# Patient Record
Sex: MALE | Race: WHITE | URBAN - METROPOLITAN AREA
[De-identification: names, ages, dates, MRNs, and addresses within clinical notes are randomized per-mention and may not be internally consistent; named-entity substitution may affect disease eponyms.]

---

## 2017-01-05 ENCOUNTER — TELEPHONE (OUTPATIENT)
Dept: FAMILY MEDICINE | Facility: CLINIC | Age: 37
End: 2017-01-05

## 2017-01-05 NOTE — TELEPHONE ENCOUNTER
Panel Management Review      Patient has the following on his problem list:     Hypertension   Last three blood pressure readings:  BP Readings from Last 3 Encounters:   06/10/16 132/92   05/13/16 160/100   03/16/16 134/92     Blood pressure: Failed    HTN Guidelines:  Age 18-59 BP range:  Less than 140/90  Age 60-85 with Diabetes:  Less than 140/90  Age 60-85 without Diabetes:  less than 150/90      Composite cancer screening  Chart review shows that this patient is due/due soon for the following None  Summary:    Patient is due/failing the following:   BP CHECK and LDL    Action needed:   Patient needs nurse only appointment.    Type of outreach:    Phone, spoke to patient.  patient states that he has moved and will be establishing care with a new clinic.      Questions for provider review:    None                                                                                                                                    Michi Palm CMA       Chart routed to n/a .

## 2017-10-06 ENCOUNTER — TRANSFERRED RECORDS (OUTPATIENT)
Dept: HEALTH INFORMATION MANAGEMENT | Facility: CLINIC | Age: 37
End: 2017-10-06

## 2017-10-19 ENCOUNTER — TELEPHONE (OUTPATIENT)
Dept: SURGERY | Facility: CLINIC | Age: 37
End: 2017-10-19

## 2017-10-19 ENCOUNTER — OFFICE VISIT (OUTPATIENT)
Dept: NEUROSURGERY | Facility: CLINIC | Age: 37
End: 2017-10-19
Payer: COMMERCIAL

## 2017-10-19 VITALS — BODY MASS INDEX: 29.1 KG/M2 | TEMPERATURE: 98 F | HEIGHT: 68 IN | WEIGHT: 192 LBS

## 2017-10-19 DIAGNOSIS — M51.9 DISC DISORDER OF LUMBAR REGION: Primary | ICD-10-CM

## 2017-10-19 DIAGNOSIS — M51.26 LUMBAR DISC HERNIATION: ICD-10-CM

## 2017-10-19 PROCEDURE — 99204 OFFICE O/P NEW MOD 45 MIN: CPT | Performed by: NEUROLOGICAL SURGERY

## 2017-10-19 RX ORDER — HYDROCODONE BITARTRATE AND ACETAMINOPHEN 10; 325 MG/1; MG/1
1 TABLET ORAL EVERY 4 HOURS PRN
Qty: 60 TABLET | Refills: 0 | Status: SHIPPED | OUTPATIENT
Start: 2017-10-19 | End: 2017-11-13 | Stop reason: ALTCHOICE

## 2017-10-19 ASSESSMENT — PAIN SCALES - GENERAL: PAINLEVEL: MODERATE PAIN (5)

## 2017-10-19 NOTE — PROGRESS NOTES
"37-year-old male with L4 5 disc herniation and extruded fragment, severe left leg pain.  Initially had a back injury seven years ago.  Now, over the last four weeks, having eight out of 10, sharp pain from the left hip, the posterior thigh and calf.  No overt weakness.  Worse when he walks.  When he had his past injury, he had successful management with physical therapy and an epidural steroid injection.  Had MRI of the lumbar spine which demonstrated L4 5 disc herniation.         Past Medical History:   Diagnosis Date     Anxiety      Anxiety      Hypertension      Past Surgical History:   Procedure Laterality Date     HC REPAIR EXTEN TENDON FINGER W/O GRAFT, EACH  5/2/2006    Repair of extensor tendon, left hand, long finger MP joint.     Social History     Social History     Marital status: Single     Spouse name: N/A     Number of children: N/A     Years of education: N/A     Occupational History     Not on file.     Social History Main Topics     Smoking status: Former Smoker     Packs/day: 1.00     Types: Cigarettes     Quit date: 6/2/2016     Smokeless tobacco: Current User     Types: Chew     Alcohol use 0.0 oz/week     0 Standard drinks or equivalent per week      Comment: rare     Drug use: Yes      Comment: occasionally marijuana     Sexual activity: Not Currently     Other Topics Concern     Not on file     Social History Narrative     Family History   Problem Relation Age of Onset     Other Cancer Mother      skin     Other Cancer Maternal Grandfather      pancreatic     Anxiety Disorder Mother         ROS: 10 point ROS neg other than the symptoms noted above in the HPI.    Physical Exam  Temp 98  F (36.7  C) (Temporal)  Ht 1.727 m (5' 8\")  Wt 87.1 kg (192 lb)  BMI 29.19 kg/m2  HEENT:  Normocephalic, atraumatic.  PERRLA.  EOM s intact.  Visual fields full to gross exam  Neck:  Supple, non-tender, without lymphadenopathy.  Heart:  No peripheral edema  Lungs:  No SOB  Abdomen:  Non-distended.   Skin:  " Warm and dry.  Extremities:  No edema, cyanosis or clubbing.    NEUROLOGICAL EXAMINATION:     Mental status:  Alert and Oriented x 3, speech is fluent.  Cranial nerves:  II-XII intact.   Motor:    Shoulder Abduction:  Right:  5/5   Left:  5/5  Biceps:                      Right:  5/5   Left:  5/5  Triceps:                     Right:  5/5   Left:  5/5  Wrist Extensors:       Right:  5/5   Left:  5/5  Wrist Flexors:           Right:  5/5   Left:  5/5  interosseus :            Right:  5/5   Left:  5/5   Hip Flexor:                Right: 5/5  Left:  5/5  Hip Adductor:             Right:  5/5  Left:  5/5  Hip Abductor:             Right:  5/5  Left:  5/5  Gastroc Soleus:        Right:  5/5  Left:  5/5  Tib/Ant:                      Right:  5/5  Left:  5/5  EHL:                     Right:  5/5  Left:  5/5  Sensation:  Intact  Reflexes:  Negative Babinski.  Negative Clonus.  Negative Villanueva's.  Coordination:  Smooth finger to nose testing.   Negative pronator drift.  Smooth tandem walking.    A/P:  37-year-old male with L4 5 disc herniation and extruded fragment, severe left leg pain    I had a discussion with the patient, reviewing the history, symptoms, and imaging  We will start with a course of conservative management  Will refer for physical therapy and left L4 5 epidural steroid injection  We did give him a prescription for Norco  We discussed that if his pain persists, we couldn't the future consider minimally invasive microdiscectomy

## 2017-10-19 NOTE — PATIENT INSTRUCTIONS
1. Referral for injection with Dr. Sloan. They will call you to schedule.  2. Referral for physical therapy. They will call you to schedule.   3. Call clinic if symptoms persist.      Please call our clinic with any questions or concerns: [phone]

## 2017-10-19 NOTE — PROGRESS NOTES
"Jared J Behm is a 37 year old male who presents for:  Chief Complaint   Patient presents with     Neurologic Problem     low back issues w/ left leg pain        Initial Vitals:  Temp 98  F (36.7  C) (Temporal)  Ht 5' 8\" (1.727 m)  Wt 192 lb (87.1 kg)  BMI 29.19 kg/m2 Estimated body mass index is 29.19 kg/(m^2) as calculated from the following:    Height as of this encounter: 5' 8\" (1.727 m).    Weight as of this encounter: 192 lb (87.1 kg).. Body surface area is 2.04 meters squared. BP completed using cuff size: NA (Not Taken)  Moderate Pain (5)    Do you feel safe in your environment?  Yes  Do you need any refills today? No    Nursing Comments:         Lurdes Dockery CMA    "

## 2017-10-19 NOTE — MR AVS SNAPSHOT
"              After Visit Summary   10/19/2017    Jared J Behm    MRN: [id number]           Patient Information     Date Of Birth          1980        Visit Information        Provider Department      10/19/2017 2:20 PM Sam Briscoe MD [facility]        Today's Diagnoses     Disc disorder of lumbar region    -  1    Lumbar disc herniation          Care Instructions    1. Referral for injection with Dr. Sloan. They will call you to schedule.  2. Referral for physical therapy. They will call you to schedule.   3. Call clinic if symptoms persist.      Please call our clinic with any questions or concerns: [phone]            Follow-ups after your visit        Additional Services     PHYSICAL THERAPY REFERRAL       *This therapy referral will be filtered to a centralized scheduling office at [facility] and the patient will receive a call to schedule an appointment at a [place] location most convenient for them. *     [facility] provides Physical Therapy evaluation and treatment and many specialty services across the [place] system.  If requesting a specialty program, please choose from the list below.    If you have not heard from the scheduling office within 2 business days, please call [phone] for all locations, with the exception of [place], please call [phone].  Treatment: Evaluation & Treatment  Special Instructions/Modalities:   Special Programs: None    Please be aware that coverage of these services is subject to the terms and limitations of your health insurance plan.  Call member services at your health plan with any benefit or coverage questions.      **Note to Provider:  If you are referring outside of [facility] for the therapy appointment, please list the name of the location in the \"special instructions\" above, print the referral and give to the patient to schedule the appointment.                  Future tests that were " "ordered for you today     Open Future Orders        Priority Expected Expires Ordered    XR Lumbar Epidural Injection Incl Imaging Routine 10/19/2017 10/19/2018 10/19/2017            Who to contact     If you have questions or need follow up information about today's clinic visit or your schedule please contact [facility] directly at [phone].  Normal or non-critical lab and imaging results will be communicated to you by MyChart, letter or phone within 4 business days after the clinic has received the results. If you do not hear from us within 7 days, please contact the clinic through [facility] or phone. If you have a critical or abnormal lab result, we will notify you by phone as soon as possible.  Submit refill requests through [facility] or call your pharmacy and they will forward the refill request to us. Please allow 3 business days for your refill to be completed.          Additional Information About Your Visit        [facility] Information     [facility] lets you send messages to your doctor, view your test results, renew your prescriptions, schedule appointments and more. To sign up, go to [email or web address] . Click on \"Log in\" on the left side of the screen, which will take you to the Welcome page. Then click on \"Sign up Now\" on the right side of the page.     You will be asked to enter the access code listed below, as well as some personal information. Please follow the directions to create your username and password.     Your access code is: [email or web address]  Expires: 2018  2:23 PM     Your access code will  in 90 days. If you need help or a new code, please call your [facility] clinic or [phone].        Care EveryWhere ID     This is your Care EveryWhere ID. This could be used by other organizations to access your [facility] medical records  [id number]        Your Vitals Were     Temperature Height BMI (Body Mass Index)             98  F (36.7  C) (Temporal) 5' 8\" (1.727 m) 29.19 " kg/m2          Blood Pressure from Last 3 Encounters:   06/10/16 (!) 132/92   05/13/16 (!) 160/100   03/16/16 (!) 134/92    Weight from Last 3 Encounters:   10/19/17 192 lb (87.1 kg)   11/30/16 193 lb (87.5 kg)   06/10/16 173 lb (78.5 kg)              We Performed the Following     PHYSICAL THERAPY REFERRAL          Today's Medication Changes          These changes are accurate as of: 10/19/17  2:23 PM.  If you have any questions, ask your nurse or doctor.               Start taking these medicines.        Dose/Directions    HYDROcodone-acetaminophen  MG per tablet   Commonly known as:  NORCO   Used for:  Disc disorder of lumbar region   Started by:  Sam Briscoe MD        Dose:  1 tablet   Take 1 tablet by mouth every 4 hours as needed for moderate to severe pain maximum 4 tablet(s) per day   Quantity:  60 tablet   Refills:  0            Where to get your medicines      Some of these will need a paper prescription and others can be bought over the counter.  Ask your nurse if you have questions.     Bring a paper prescription for each of these medications     HYDROcodone-acetaminophen  MG per tablet                Primary Care Provider    None Specified       No primary provider on file.        Equal Access to Services     JENNIFER IRELAND : Edda Tinsley, saba acosta, qanorbert kaalkath jordan, shabbir eduardo. So [facility] [phone].    ATENCIÓN: Si habla español, tiene a torres disposición servicios gratuitos de asistencia lingüística. Llame al [phone].    We comply with applicable federal civil rights laws and [place] laws. We do not discriminate on the basis of race, color, national origin, age, disability, sex, sexual orientation, or gender identity.            Thank you!     Thank you for choosing [facility]  for your care. Our goal is always to provide you with excellent care. Hearing back from our patients is one way we can  continue to improve our services. Please take a few minutes to complete the written survey that you may receive in the mail after your visit with us. Thank you!             Your Updated Medication List - Protect others around you: Learn how to safely use, store and throw away your medicines at www.disposemymeds.org.          This list is accurate as of: 10/19/17  2:23 PM.  Always use your most recent med list.                   Brand Name Dispense Instructions for use Diagnosis    ALPRAZolam 0.25 MG tablet    XANAX    30 tablet    Take 1 tablet (0.25 mg) by mouth 2 times daily as needed for anxiety (anxiety attacks)    Social anxiety disorder       atenolol-chlorthalidone 50-25 MG per tablet    TENORETIC    30 tablet    TAKE ONE TABLET BY MOUTH ONCE DAILY    Essential hypertension, benign       clotrimazole 1 % cream    LOTRIMIN    15 g    Apply topically 2 times daily    Tinea cruris       divalproex 250 MG 24 hr tablet    DEPAKOTE ER    30 tablet    Take 1 tablet (250 mg) by mouth daily    Depression with anxiety       HYDROcodone-acetaminophen  MG per tablet    NORCO    60 tablet    Take 1 tablet by mouth every 4 hours as needed for moderate to severe pain maximum 4 tablet(s) per day    Disc disorder of lumbar region

## 2017-10-23 ENCOUNTER — TELEPHONE (OUTPATIENT)
Dept: SURGERY | Facility: CLINIC | Age: 37
End: 2017-10-23

## 2017-10-24 ENCOUNTER — TRANSFERRED RECORDS (OUTPATIENT)
Dept: HEALTH INFORMATION MANAGEMENT | Facility: CLINIC | Age: 37
End: 2017-10-24

## 2017-10-26 NOTE — TELEPHONE ENCOUNTER
Patient called and scheduled LESI with Dr. Sloan 12/14/17 at 0800.  Instructed to have a  and to make an appt for a preop physical.

## 2017-11-13 ENCOUNTER — TELEPHONE (OUTPATIENT)
Dept: NEUROSURGERY | Facility: CLINIC | Age: 37
End: 2017-11-13

## 2017-11-13 DIAGNOSIS — G89.29 CHRONIC BILATERAL LOW BACK PAIN, WITH SCIATICA PRESENCE UNSPECIFIED: Primary | ICD-10-CM

## 2017-11-13 DIAGNOSIS — M54.5 CHRONIC BILATERAL LOW BACK PAIN, WITH SCIATICA PRESENCE UNSPECIFIED: Primary | ICD-10-CM

## 2017-11-13 RX ORDER — OXYCODONE AND ACETAMINOPHEN 5; 325 MG/1; MG/1
1-2 TABLET ORAL EVERY 6 HOURS PRN
Qty: 60 TABLET | Refills: 0 | Status: ON HOLD | OUTPATIENT
Start: 2017-11-13 | End: 2018-08-09

## 2017-11-13 NOTE — TELEPHONE ENCOUNTER
Reason for Call:  Medication or medication refill:    Do you use a [facility]?  Name of the pharmacy and phone number for the current request:  [facility] - [place] - [phone]    Name of the medication requested: Patient was on Hydrocodone but this hasn't been working for him, wondering if he can get something different. Wasn't able to get injection until 12/14 looking to get something until then.     Other request:       Can we leave a detailed message on this number? YES    Phone number patient can be reached at: Home number on file [phone] (home)    Best Time: any    Call taken on 11/13/2017 at 7:58 AM by Aminata Pollack

## 2017-11-13 NOTE — TELEPHONE ENCOUNTER
Patient called back. He recently saw Dr. Briscoe and is schedule for injection on 12/14/17. Dr. Briscoe prescribed norco at the time, but patient is requesting something a little stronger to help manage pain until injection. Offered percocet; pt in agreement. Will send to Shaq Kan PA-C and have available at [facility] pharmacy for .

## 2017-11-24 ENCOUNTER — TRANSFERRED RECORDS (OUTPATIENT)
Dept: HEALTH INFORMATION MANAGEMENT | Facility: CLINIC | Age: 37
End: 2017-11-24

## 2017-11-29 ENCOUNTER — TRANSFERRED RECORDS (OUTPATIENT)
Dept: HEALTH INFORMATION MANAGEMENT | Facility: CLINIC | Age: 37
End: 2017-11-29

## 2017-12-13 ENCOUNTER — ANESTHESIA EVENT (OUTPATIENT)
Dept: SURGERY | Facility: CLINIC | Age: 37
End: 2017-12-13
Payer: COMMERCIAL

## 2017-12-13 ASSESSMENT — LIFESTYLE VARIABLES: TOBACCO_USE: 1

## 2017-12-14 ENCOUNTER — HOSPITAL ENCOUNTER (OUTPATIENT)
Facility: CLINIC | Age: 37
Discharge: HOME | End: 2017-12-14
Attending: ANESTHESIOLOGY | Admitting: ANESTHESIOLOGY
Payer: COMMERCIAL

## 2017-12-14 ENCOUNTER — ANESTHESIA (OUTPATIENT)
Dept: SURGERY | Facility: CLINIC | Age: 37
End: 2017-12-14
Payer: COMMERCIAL

## 2017-12-14 ENCOUNTER — HOSPITAL ENCOUNTER (OUTPATIENT)
Dept: GENERAL RADIOLOGY | Facility: CLINIC | Age: 37
End: 2017-12-14
Attending: ANESTHESIOLOGY | Admitting: ANESTHESIOLOGY
Payer: COMMERCIAL

## 2017-12-14 VITALS
SYSTOLIC BLOOD PRESSURE: 114 MMHG | RESPIRATION RATE: 16 BRPM | DIASTOLIC BLOOD PRESSURE: 81 MMHG | OXYGEN SATURATION: 96 %

## 2017-12-14 DIAGNOSIS — M51.26 LUMBAR HERNIATED DISC: ICD-10-CM

## 2017-12-14 PROCEDURE — 25000125 ZZHC RX 250: Performed by: NURSE ANESTHETIST, CERTIFIED REGISTERED

## 2017-12-14 PROCEDURE — 25000128 H RX IP 250 OP 636: Performed by: NURSE ANESTHETIST, CERTIFIED REGISTERED

## 2017-12-14 PROCEDURE — 62323 NJX INTERLAMINAR LMBR/SAC: CPT | Performed by: ANESTHESIOLOGY

## 2017-12-14 PROCEDURE — 25000128 H RX IP 250 OP 636: Performed by: ANESTHESIOLOGY

## 2017-12-14 PROCEDURE — 40000277 XR FLUORO NEEDLE PLACEMENT SPINE (WITH PROCEDURE)

## 2017-12-14 PROCEDURE — 37000008 ZZH ANESTHESIA TECHNICAL FEE, 1ST 30 MIN: Performed by: ANESTHESIOLOGY

## 2017-12-14 RX ORDER — LIDOCAINE HYDROCHLORIDE 20 MG/ML
INJECTION, SOLUTION INFILTRATION; PERINEURAL PRN
Status: DISCONTINUED | OUTPATIENT
Start: 2017-12-14 | End: 2017-12-14

## 2017-12-14 RX ORDER — IOPAMIDOL 612 MG/ML
INJECTION, SOLUTION INTRATHECAL PRN
Status: DISCONTINUED | OUTPATIENT
Start: 2017-12-14 | End: 2017-12-14 | Stop reason: HOSPADM

## 2017-12-14 RX ORDER — ONDANSETRON 2 MG/ML
4 INJECTION INTRAMUSCULAR; INTRAVENOUS EVERY 30 MIN PRN
Status: CANCELLED | OUTPATIENT
Start: 2017-12-14

## 2017-12-14 RX ORDER — FENTANYL CITRATE 50 UG/ML
25-50 INJECTION, SOLUTION INTRAMUSCULAR; INTRAVENOUS
Status: CANCELLED | OUTPATIENT
Start: 2017-12-14

## 2017-12-14 RX ORDER — SODIUM CHLORIDE, SODIUM LACTATE, POTASSIUM CHLORIDE, CALCIUM CHLORIDE 600; 310; 30; 20 MG/100ML; MG/100ML; MG/100ML; MG/100ML
INJECTION, SOLUTION INTRAVENOUS CONTINUOUS
Status: CANCELLED | OUTPATIENT
Start: 2017-12-14

## 2017-12-14 RX ORDER — PROPOFOL 10 MG/ML
INJECTION, EMULSION INTRAVENOUS PRN
Status: DISCONTINUED | OUTPATIENT
Start: 2017-12-14 | End: 2017-12-14

## 2017-12-14 RX ORDER — TRIAMCINOLONE ACETONIDE 40 MG/ML
INJECTION, SUSPENSION INTRA-ARTICULAR; INTRAMUSCULAR PRN
Status: DISCONTINUED | OUTPATIENT
Start: 2017-12-14 | End: 2017-12-14 | Stop reason: HOSPADM

## 2017-12-14 RX ORDER — ARIPIPRAZOLE ORAL 1 MG/ML
5 SOLUTION ORAL DAILY
COMMUNITY

## 2017-12-14 RX ORDER — LIDOCAINE 40 MG/G
CREAM TOPICAL
Status: DISCONTINUED | OUTPATIENT
Start: 2017-12-14 | End: 2017-12-14 | Stop reason: HOSPADM

## 2017-12-14 RX ORDER — MEPERIDINE HYDROCHLORIDE 50 MG/ML
12.5 INJECTION INTRAMUSCULAR; INTRAVENOUS; SUBCUTANEOUS
Status: CANCELLED | OUTPATIENT
Start: 2017-12-14

## 2017-12-14 RX ORDER — ONDANSETRON 4 MG/1
4 TABLET, ORALLY DISINTEGRATING ORAL EVERY 30 MIN PRN
Status: CANCELLED | OUTPATIENT
Start: 2017-12-14

## 2017-12-14 RX ORDER — NALOXONE HYDROCHLORIDE 0.4 MG/ML
.1-.4 INJECTION, SOLUTION INTRAMUSCULAR; INTRAVENOUS; SUBCUTANEOUS
Status: CANCELLED | OUTPATIENT
Start: 2017-12-14 | End: 2017-12-15

## 2017-12-14 RX ORDER — SODIUM CHLORIDE, SODIUM LACTATE, POTASSIUM CHLORIDE, CALCIUM CHLORIDE 600; 310; 30; 20 MG/100ML; MG/100ML; MG/100ML; MG/100ML
INJECTION, SOLUTION INTRAVENOUS CONTINUOUS
Status: DISCONTINUED | OUTPATIENT
Start: 2017-12-14 | End: 2017-12-14 | Stop reason: HOSPADM

## 2017-12-14 RX ADMIN — PROPOFOL 50 MG: 10 INJECTION, EMULSION INTRAVENOUS at 09:13

## 2017-12-14 RX ADMIN — LIDOCAINE HYDROCHLORIDE 1 ML: 10 INJECTION, SOLUTION EPIDURAL; INFILTRATION; INTRACAUDAL; PERINEURAL at 08:41

## 2017-12-14 RX ADMIN — PROPOFOL 50 MG: 10 INJECTION, EMULSION INTRAVENOUS at 09:21

## 2017-12-14 RX ADMIN — PROPOFOL 30 MG: 10 INJECTION, EMULSION INTRAVENOUS at 09:19

## 2017-12-14 RX ADMIN — LIDOCAINE HYDROCHLORIDE 40 MG: 20 INJECTION, SOLUTION INFILTRATION; PERINEURAL at 09:13

## 2017-12-14 RX ADMIN — PROPOFOL 30 MG: 10 INJECTION, EMULSION INTRAVENOUS at 09:18

## 2017-12-14 RX ADMIN — PROPOFOL 20 MG: 10 INJECTION, EMULSION INTRAVENOUS at 09:17

## 2017-12-14 RX ADMIN — PROPOFOL 50 MG: 10 INJECTION, EMULSION INTRAVENOUS at 09:15

## 2017-12-14 RX ADMIN — PROPOFOL 50 MG: 10 INJECTION, EMULSION INTRAVENOUS at 09:14

## 2017-12-14 RX ADMIN — SODIUM CHLORIDE, POTASSIUM CHLORIDE, SODIUM LACTATE AND CALCIUM CHLORIDE: 600; 310; 30; 20 INJECTION, SOLUTION INTRAVENOUS at 08:41

## 2017-12-14 NOTE — IP AVS SNAPSHOT
MRN:[id number]                      After Visit Summary   12/14/2017    Jared J Behm    MRN: [id number]           Thank you!     Thank you for choosing [facility] for your care. Our goal is always to provide you with excellent care. Hearing back from our patients is one way we can continue to improve our services. Please take a few minutes to complete the written survey that you may receive in the mail after you visit with us. Thank you!        Patient Information     Date Of Birth          1980        About your hospital stay     You were admitted on:  December 14, 2017 You last received care in the:  [facility] Phase II    You were discharged on:  December 14, 2017       Who to Call     For medical emergencies, please call 911.  For non-urgent questions about your medical care, please call your primary care provider or clinic, [phone]  For questions related to your surgery, please call your surgery clinic        Attending Provider     Provider Specialty    Boy Sloan [facility]       Primary Care Provider Office Phone # Fax #    Radha [place] Mai, [phone]      After Care Instructions     Discharge Instructions       Review outpatient procedure discharge instructions as directed by Provider.                  Further instructions from your care team       Home Care Instructions  [facility] [phone]               Procedure:  Epidural Spine Injection or Joint injection    Activity:    Rest today    Do not work today    Resume normal activity tomorrow    Pain:    You may experience soreness at the injection site for one or two days    You may use an ice pack for 20 minutes every 2 hours for the first 24 hours    You may use a heating pad after the first 24 hours    SUBJECTIVE:  Artis returns for followup of *** surgery. In the interval of time Artis is doing well.   He is experiencing {STIFF:577668}, soreness  "{NONE/MINOR/SIGNIFICANT/MAJOR:116361::\"None\"}, and states weakness is {PRESENT/ABSENT:277906::\"Present\"} with {NONE/MINOR/SIGNIFICANT/MAJOR:607502::\"None\"} trauma since last seen by us.    ROS: Musculoskeletal and general review of systems are negative, per review of previous clinic questionnaire.  {ROS SHORT:466112}    EXAM:   Wounds: Mobility appropriate, no erythema, no drainage, wounds healing well, excellent mobility  Swelling:{PRESENT:039283::\"Present\"}  Range of motion appropriate for this recovery time. With *** restrictions.  Wound erythema: {NONE/MINOR/SIGNIFICANT/MAJOR:740888::\"None\"}  {MSALL:317879}    PLAN OF CARE: {PLAN:757742}    IMPRESSION: {NUMBERS 1-12:10} {TIME FRAME:972711} post ***    RECOMMENDATIONS: {RECOM POST OP ORTO:454501}        You may use Tylenol  (acetaminophen) every 4 hours or other pain medicines as directed by your physician    Safety  Sedation medicine, if given may remain active for many hours.    It is important for the next 24 hours that you do not:    Drive a car    Operate machines or power tools    Consume alcohol, including beer    Sign any important papers or legal documents    You may experience numbness radiating into your legs or arms, (depending on the procedure location)  This numbness may last several hours.  Until the numb sensation returns to normal please use caution in walking, climbing stairs, stepping out of your vehicle, etc.    [facility]   Adult Discharge Orders & Instructions     For 24 hours after surgery    1. Get plenty of rest.  A responsible adult must stay with you for at least 24 hours after you leave the hospital.   2. Do not drive or use heavy equipment.  If you have weakness or tingling, don't drive or use heavy equipment until this feeling goes away.  3. Do not drink alcohol.  4. Avoid strenuous or risky activities.  Ask for help when climbing stairs.   5. You may feel lightheaded.  IF so, sit for a few minutes before standing.  " Have someone help you get up.   6. If you have nausea (feel sick to your stomach): Drink only clear liquids such as apple juice, ginger ale, broth or 7-Up.  Rest may also help.  Be sure to drink enough fluids.  Move to a regular diet as you feel able.  7. You may have a slight fever. Call the doctor if your fever is over 100 F (37.7 C) (taken under the tongue) or lasts longer than 24 hours.  8. You may have a dry mouth, a sore throat, muscle aches or trouble sleeping.  These should go away after 24 hours.  9. Do not make important or legal decisions.   Call your doctor for any of the followin.  Signs of infection (fever, growing tenderness at the surgery site, a large amount of drainage or bleeding, severe pain, foul-smelling drainage, redness, swelling).    2. It has been over 8 to 10 hours since surgery and you are still not able to urinate (pass water).    3.  Headache for over 24 hours.    4.  Numbness, tingling or weakness the day after surgery (if you had spinal anesthesia).  To contact a doctor, call ________________________________________    Common side effects of steroids:  Not everyone will experience corticosteroid side effects. If side effects are experienced they will gradually subside in the 7-10 day period following an injection.    Most common side effects include:    Flushed face and/or chest    Feeling of warmth, particularly in face but could be overall feeling of warmth    Increased blood sugar in diabetic patients    Menstrual irregularities may occur.  If taking hormone based birth control an alternate method of birth control is recommended    Sleep disturbances and/or mood swings are possible    Leg cramps    Please contact us if you have:  Severe pain   Fever more than 101.5 degrees Fahrenheit  Signs of infection (redness, swelling or drainage)      If you have questions, please contact Dr. Sloan's office at [phone] between the hours of 8:00am and 5:00pm Monday through Friday.  "After office hours you can contact the on call provider by dialing [phone].  If you need immediate attention we recommend that you go to a hospital emergency room or dial 911.                 Pending Results     No orders found from 2017 to 12/15/2017.            Admission Information     Date & Time Provider Department Dept. Phone    2017 Boy Sloan MD [facility] Phase [phone]      Your Vitals Were     Blood Pressure Respirations Pulse Oximetry             116/77 16 96%         MyChart Information     [email or web address] lets you send messages to your doctor, view your test results, renew your prescriptions, schedule appointments and more. To sign up, go to [email or web address] . Click on \"Log in\" on the left side of the screen, which will take you to the Welcome page. Then click on \"Sign up Now\" on the right side of the page.     You will be asked to enter the access code listed below, as well as some personal information. Please follow the directions to create your username and password.     Your access code is: [email or web address]  Expires: 2018  1:23 PM     Your access code will  in 90 days. If you need help or a new code, please call your [place] clinic or [phone].        Care EveryWhere ID     This is your Care EveryWhere ID. This could be used by other organizations to access your [place] medical records  [id number]        Equal Access to Services     [facility] : Hadii nichole narvaez hadasho Sojyotiali, waaxda luqadaha, qaybta kaalmada adeegyada, shabbir martines . So [facility] [phone].    ATENCIÓN: Si habla español, tiene a torres disposición servicios gratuitos de asistencia lingüística. Llame al [phone].    We comply with applicable federal civil rights laws and [place] laws. We do not discriminate on the basis of race, color, national origin, age, disability, sex, sexual orientation, or gender identity.               Review of your medicines " "     CONTINUE these medicines which have NOT CHANGED        Dose / Directions    ALPRAZolam 0.25 MG tablet   Commonly known as:  XANAX   Used for:  Social anxiety disorder        Dose:  0.25 mg   Take 1 tablet (0.25 mg) by mouth 2 times daily as needed for anxiety (anxiety attacks)   Quantity:  30 tablet   Refills:  0       ARIPiprazole 1 MG/ML Soln solution   Commonly known as:  ABILIFY   Indication:  for \"anger\"        Dose:  5 mg   Take 5 mg by mouth daily   Refills:  0       atenolol-chlorthalidone 50-25 MG per tablet   Commonly known as:  TENORETIC   Used for:  Essential hypertension, benign        TAKE ONE TABLET BY MOUTH ONCE DAILY   Quantity:  30 tablet   Refills:  3       divalproex 250 MG 24 hr tablet   Commonly known as:  DEPAKOTE ER   Used for:  Depression with anxiety        Dose:  250 mg   Take 1 tablet (250 mg) by mouth daily   Quantity:  30 tablet   Refills:  3       FLUOXETINE HCL PO   Indication:  Depression        Dose:  20 mg   Take 20 mg by mouth daily   Refills:  0       oxyCODONE-acetaminophen 5-325 MG per tablet   Commonly known as:  PERCOCET   Used for:  Chronic bilateral low back pain, with sciatica presence unspecified        Dose:  1-2 tablet   Take 1-2 tablets by mouth every 6 hours as needed for pain   Quantity:  60 tablet   Refills:  0       UNKNOWN TO PATIENT        Refills:  0                Protect others around you: Learn how to safely use, store and throw away your medicines at www.disposemymeds.org.             Medication List: This is a list of all your medications and when to take them. Check marks below indicate your daily home schedule. Keep this list as a reference.      Medications           Morning Afternoon Evening Bedtime As Needed    ALPRAZolam 0.25 MG tablet   Commonly known as:  XANAX   Take 1 tablet (0.25 mg) by mouth 2 times daily as needed for anxiety (anxiety attacks)                                ARIPiprazole 1 MG/ML Soln solution   Commonly known as:  ABILIFY "   Take 5 mg by mouth daily                                atenolol-chlorthalidone 50-25 MG per tablet   Commonly known as:  TENORETIC   TAKE ONE TABLET BY MOUTH ONCE DAILY                                divalproex 250 MG 24 hr tablet   Commonly known as:  DEPAKOTE ER   Take 1 tablet (250 mg) by mouth daily                                FLUOXETINE HCL PO   Take 20 mg by mouth daily                                oxyCODONE-acetaminophen 5-325 MG per tablet   Commonly known as:  PERCOCET   Take 1-2 tablets by mouth every 6 hours as needed for pain                                UNKNOWN TO PATIENT

## 2017-12-14 NOTE — ANESTHESIA PREPROCEDURE EVALUATION
Anesthesia Evaluation     . Pt has had prior anesthetic. Type: General    No history of anesthetic complications          ROS/MED HX    ENT/Pulmonary:     (+)tobacco use, Past use , . .    Neurologic:  - neg neurologic ROS     Cardiovascular:     (+) hypertension----. : . . . :. . No previous cardiac testing       METS/Exercise Tolerance:     Hematologic:  - neg hematologic  ROS       Musculoskeletal:   (+) , , other musculoskeletal- low back pain      GI/Hepatic:  - neg GI/hepatic ROS       Renal/Genitourinary:  - ROS Renal section negative       Endo:  - neg endo ROS       Psychiatric:     (+) psychiatric history anxiety, bipolar and depression      Infectious Disease:  - neg infectious disease ROS       Malignancy:      - no malignancy   Other:    - neg other ROS                 Physical Exam  Normal systems: cardiovascular, pulmonary and dental    Airway   Mallampati: II  TM distance: >3 FB  Neck ROM: full    Dental     Cardiovascular   Rhythm and rate: regular and normal      Pulmonary    breath sounds clear to auscultation                    Anesthesia Plan      History & Physical Review  History and physical reviewed and following examination; no interval change.    ASA Status:  2 .    NPO Status:  > 8 hours    Plan for MAC with Propofol induction. Reason for MAC:  Deep or markedly invasive procedure (G8)         Postoperative Care      Consents  Anesthetic plan, risks, benefits and alternatives discussed with:  Patient..                          .

## 2017-12-14 NOTE — OP NOTE
PRIMARY PROBLEM: Low back pain and leg pains    PROCEDURE: Bilateral L4-5  Transforaminal Epidural Steroid Injections with fluoroscopic guidance and contrast.     PROCEDURE DETAILS: After written informed consent was obtained from the patient, the patient was escorted to the procedure room.  The patient was placed in the prone position.  A  time out  was conducted to verify patient identity, procedure to be performed, side, site, allergies and any special requirements.  The skin over the thoracolumbar region was prepped and draped in normal sterile fashion. Fluoroscopy was used to identify the neural foramen in AP view and the skin was anesthetized with 2 mL of 1% lidocaine with bicarbonate buffer. A 22-gauge,   5-inch Quincke spinal needle was advanced through this location and advanced under fluoroscopic guidance towards the neural foramen.  The target zone was the 6 o clock position of the pedicle.   Prior to entering the foramen, the depth of the needle was gauged with a lateral view on fluoroscopy. While still in a lateral view, the needle was slowly advanced to avoid injury to the spinal nerve.  Then, in the oblique view (approximately 28 degrees), after negative aspiration, 0.5 mL of Omnipaque contrast dye was injected revealing epidural spread without evidence of intravascular or intrathecal spread.  Then a 3cc solution of 40 mg of Triamcinolone in 2 mL of  Preservative-Free saline was slowly injected into the epidural space at each segment.  After injection of the medication, as the needle tip was withdrawn, it was flushed with local anesthetic. I performed this at his bilateral L4-5 segments but I fel the spread of medication into his epidural space could be even better therefore I advanced another 20- guage Tuohy needle into his interlaminar epidural space and injected contrast at this level and it showed very good epidural spread. I followed this inection by injecting another 3 cc of solution containing  20 mg of Kenalog with 2.5 cc of saline.   The patient was monitored with blood pressure and pulse oximetry machines with the assistance of an RN throughout the procedure.  The patient was alert and responsive to questions throughout the procedure.   The patient tolerated the procedure well and was observed in the post-procedural area.  The patient was dismissed without apparent complications.     DIAGNOSIS:  1.  Disc extrusion at L4-5 causing bilateral leg pains    PLAN:  1. Performed bilateral L4-5  transforaminal epidural steroid injections.  2. The patient was instructed to follow-up per Dr. Sloan's instructions.  If this is not helpful I recommended seeing Dr. Briscoe again for possible surgical options.    Boy Sloan MD  Diplomate of the American Board of Anesthesiology, Pain Medicine

## 2017-12-14 NOTE — ANESTHESIA CARE TRANSFER NOTE
Patient: Jared J Behm    Procedure(s):  lumbar 4-5 transforaminal epidural steroid injection, bilateral - Wound Class: I-Clean    Diagnosis: lumbar disc herniation  Diagnosis Additional Information: No value filed.    Anesthesia Type:   MAC     Note:  Airway :Room Air  Patient transferred to:Phase II  Handoff Report: Identifed the Patient, Identified the Reponsible Provider, Reviewed the pertinent medical history, Discussed the surgical course, Reviewed Intra-OP anesthesia mangement and issues during anesthesia, Set expectations for post-procedure period and Allowed opportunity for questions and acknowledgement of understanding      Vitals: (Last set prior to Anesthesia Care Transfer)    CRNA VITALS  12/14/2017 0858 - 12/14/2017 0932      12/14/2017             Resp Rate (observed): 11                Electronically Signed By: LUCINDA Snell CRNA  December 14, 2017  9:32 AM

## 2017-12-14 NOTE — IP AVS SNAPSHOT
[facility] Phase II    [address]     [place] [address]    Phone:  [phone]                                       After Visit Summary   12/14/2017    Jared J Behm    MRN: [id number]           After Visit Summary Signature Page     I have received my discharge instructions, and my questions have been answered. I have discussed any challenges I see with this plan with the nurse or doctor.    ..........................................................................................................................................  Patient/Patient Representative Signature      ..........................................................................................................................................  Patient Representative Print Name and Relationship to Patient    ..................................................               ................................................  Date                                            Time    ..........................................................................................................................................  Reviewed by Signature/Title    ...................................................              ..............................................  Date                                                            Time

## 2017-12-14 NOTE — DISCHARGE INSTRUCTIONS
"Home Care Instructions  [facility] [phone]               Procedure:  Epidural Spine Injection or Joint injection    Activity:    Rest today    Do not work today    Resume normal activity tomorrow    Pain:    You may experience soreness at the injection site for one or two days    You may use an ice pack for 20 minutes every 2 hours for the first 24 hours    You may use a heating pad after the first 24 hours    SUBJECTIVE:  Artis returns for followup of *** surgery. In the interval of time Artis is doing well.   He is experiencing {STIFF:749689}, soreness {NONE/MINOR/SIGNIFICANT/MAJOR:976766::\"None\"}, and states weakness is {PRESENT/ABSENT:214823::\"Present\"} with {NONE/MINOR/SIGNIFICANT/MAJOR:341261::\"None\"} trauma since last seen by us.    ROS: Musculoskeletal and general review of systems are negative, per review of previous clinic questionnaire.  {ROS SHORT:221923}    EXAM:   Wounds: Mobility appropriate, no erythema, no drainage, wounds healing well, excellent mobility  Swelling:{PRESENT:672605::\"Present\"}  Range of motion appropriate for this recovery time. With *** restrictions.  Wound erythema: {NONE/MINOR/SIGNIFICANT/MAJOR:278089::\"None\"}  {MSALL:084330}    PLAN OF CARE: {PLAN:422538}    IMPRESSION: {NUMBERS 1-12:10} {TIME FRAME:120902} post ***    RECOMMENDATIONS: {RECOM POST OP ORTO:063105}        You may use Tylenol  (acetaminophen) every 4 hours or other pain medicines as directed by your physician    Safety  Sedation medicine, if given may remain active for many hours.    It is important for the next 24 hours that you do not:    Drive a car    Operate machines or power tools    Consume alcohol, including beer    Sign any important papers or legal documents    You may experience numbness radiating into your legs or arms, (depending on the procedure location)  This numbness may last several hours.  Until the numb sensation returns to normal please use caution in " walking, climbing stairs, stepping out of your vehicle, etc.    [facility]   Adult Discharge Orders & Instructions     For 24 hours after surgery    1. Get plenty of rest.  A responsible adult must stay with you for at least 24 hours after you leave the hospital.   2. Do not drive or use heavy equipment.  If you have weakness or tingling, don't drive or use heavy equipment until this feeling goes away.  3. Do not drink alcohol.  4. Avoid strenuous or risky activities.  Ask for help when climbing stairs.   5. You may feel lightheaded.  IF so, sit for a few minutes before standing.  Have someone help you get up.   6. If you have nausea (feel sick to your stomach): Drink only clear liquids such as apple juice, ginger ale, broth or 7-Up.  Rest may also help.  Be sure to drink enough fluids.  Move to a regular diet as you feel able.  7. You may have a slight fever. Call the doctor if your fever is over 100 F (37.7 C) (taken under the tongue) or lasts longer than 24 hours.  8. You may have a dry mouth, a sore throat, muscle aches or trouble sleeping.  These should go away after 24 hours.  9. Do not make important or legal decisions.   Call your doctor for any of the followin.  Signs of infection (fever, growing tenderness at the surgery site, a large amount of drainage or bleeding, severe pain, foul-smelling drainage, redness, swelling).    2. It has been over 8 to 10 hours since surgery and you are still not able to urinate (pass water).    3.  Headache for over 24 hours.    4.  Numbness, tingling or weakness the day after surgery (if you had spinal anesthesia).  To contact a doctor, call ________________________________________    Common side effects of steroids:  Not everyone will experience corticosteroid side effects. If side effects are experienced they will gradually subside in the 7-10 day period following an injection.    Most common side effects include:    Flushed face and/or  chest    Feeling of warmth, particularly in face but could be overall feeling of warmth    Increased blood sugar in diabetic patients    Menstrual irregularities may occur.  If taking hormone based birth control an alternate method of birth control is recommended    Sleep disturbances and/or mood swings are possible    Leg cramps    Please contact us if you have:  Severe pain   Fever more than 101.5 degrees Fahrenheit  Signs of infection (redness, swelling or drainage)      If you have questions, please contact Dr. Sloan's office at [phone] between the hours of 8:00am and 5:00pm Monday through Friday. After office hours you can contact the on call provider by dialing [phone].  If you need immediate attention we recommend that you go to a hospital emergency room or dial [phone].

## 2017-12-14 NOTE — ANESTHESIA POSTPROCEDURE EVALUATION
Patient: Jared J Behm    Procedure(s):  lumbar 4-5 transforaminal epidural steroid injection, bilateral - Wound Class: I-Clean    Diagnosis:lumbar disc herniation  Diagnosis Additional Information: No value filed.    Anesthesia Type:  MAC    Note:  Anesthesia Post Evaluation    Patient location during evaluation: Phase 2 and Bedside  Patient participation: Able to fully participate in evaluation  Level of consciousness: awake  Pain management: adequate  Airway patency: patent  Cardiovascular status: acceptable and hemodynamically stable  Respiratory status: acceptable, room air and nonlabored ventilation  Hydration status: stable  PONV: none     Anesthetic complications: None    Comments: Patient was happy with the anesthesia care received and no anesthesia related complications were noted.  I will follow up with the patient again if it is needed.        Last vitals:  There were no vitals filed for this visit.      Electronically Signed By: LUCINDA Snell CRNA  December 14, 2017  9:32 AM

## 2017-12-20 ENCOUNTER — TRANSFERRED RECORDS (OUTPATIENT)
Dept: HEALTH INFORMATION MANAGEMENT | Facility: CLINIC | Age: 37
End: 2017-12-20

## 2018-07-23 ENCOUNTER — TELEPHONE (OUTPATIENT)
Dept: NEUROSURGERY | Facility: CLINIC | Age: 38
End: 2018-07-23

## 2018-07-23 ENCOUNTER — TELEPHONE (OUTPATIENT)
Dept: SURGERY | Facility: CLINIC | Age: 38
End: 2018-07-23

## 2018-07-23 DIAGNOSIS — M51.26 LUMBAR DISC HERNIATION: Primary | ICD-10-CM

## 2018-07-23 NOTE — TELEPHONE ENCOUNTER
Patient returned call and scheduled JR on 8/9/18 at 1230 with Dr. Sloan.  Patient instructed to make appt with PCP for a Pre-Op physical and also to have a  the day of the procedure.

## 2018-07-23 NOTE — TELEPHONE ENCOUNTER
Returned call and spoke to patient. He last saw Dr. Briscoe in Oct 2017. Injection with Dr. Sloan on 12/14/17. Over the past 1-2 months, pain in low back has returned. Left leg pain has resolved after trying PT and injection. He would like to repeat injection. He states he is not interested in any type of surgery at this time. Will pend injection order and send to Dr Briscoe for approval. Advised patient to call back if symptoms persist or if he has any questions/concerns.

## 2018-07-23 NOTE — TELEPHONE ENCOUNTER
Reason for Call:  Other call back    Detailed comments: Please call pt he would like to know if Dr Briscoe could place another order for a spinal inj. . Thank You Susana      Phone Number Patient can be reached at: Home number on file [phone] (home) or Work number on file:  none (work)    Best Time: any     Can we leave a detailed message on this number? YES    Call taken on 7/23/2018 at 9:35 AM by Susana Goss

## 2018-08-08 ENCOUNTER — ANESTHESIA EVENT (OUTPATIENT)
Dept: SURGERY | Facility: CLINIC | Age: 38
End: 2018-08-08
Payer: COMMERCIAL

## 2018-08-08 ASSESSMENT — LIFESTYLE VARIABLES: TOBACCO_USE: 1

## 2018-08-09 ENCOUNTER — ANESTHESIA (OUTPATIENT)
Dept: SURGERY | Facility: CLINIC | Age: 38
End: 2018-08-09
Payer: COMMERCIAL

## 2018-08-09 ENCOUNTER — SURGERY (OUTPATIENT)
Age: 38
End: 2018-08-09

## 2018-08-09 ENCOUNTER — HOSPITAL ENCOUNTER (OUTPATIENT)
Facility: CLINIC | Age: 38
Discharge: HOME | End: 2018-08-09
Attending: ANESTHESIOLOGY | Admitting: ANESTHESIOLOGY
Payer: COMMERCIAL

## 2018-08-09 ENCOUNTER — HOSPITAL ENCOUNTER (OUTPATIENT)
Dept: GENERAL RADIOLOGY | Facility: CLINIC | Age: 38
End: 2018-08-09
Attending: ANESTHESIOLOGY | Admitting: ANESTHESIOLOGY
Payer: COMMERCIAL

## 2018-08-09 VITALS — DIASTOLIC BLOOD PRESSURE: 92 MMHG | OXYGEN SATURATION: 98 % | SYSTOLIC BLOOD PRESSURE: 116 MMHG

## 2018-08-09 DIAGNOSIS — M51.26 LUMBAR DISC HERNIATION: ICD-10-CM

## 2018-08-09 PROCEDURE — 25000125 ZZHC RX 250: Performed by: NURSE ANESTHETIST, CERTIFIED REGISTERED

## 2018-08-09 PROCEDURE — 25000128 H RX IP 250 OP 636: Performed by: ANESTHESIOLOGY

## 2018-08-09 PROCEDURE — 25000128 H RX IP 250 OP 636: Performed by: NURSE ANESTHETIST, CERTIFIED REGISTERED

## 2018-08-09 PROCEDURE — 37000008 ZZH ANESTHESIA TECHNICAL FEE, 1ST 30 MIN: Performed by: ANESTHESIOLOGY

## 2018-08-09 PROCEDURE — 62323 NJX INTERLAMINAR LMBR/SAC: CPT | Performed by: ANESTHESIOLOGY

## 2018-08-09 PROCEDURE — 40000277 XR FLUORO NEEDLE PLACEMENT SPINE (WITH PROCEDURE)

## 2018-08-09 RX ORDER — PROPOFOL 10 MG/ML
INJECTION, EMULSION INTRAVENOUS PRN
Status: DISCONTINUED | OUTPATIENT
Start: 2018-08-09 | End: 2018-08-09

## 2018-08-09 RX ORDER — ALBUTEROL SULFATE 0.83 MG/ML
2.5 SOLUTION RESPIRATORY (INHALATION) EVERY 4 HOURS PRN
Status: DISCONTINUED | OUTPATIENT
Start: 2018-08-09 | End: 2018-08-09 | Stop reason: HOSPADM

## 2018-08-09 RX ORDER — ONDANSETRON 4 MG/1
4 TABLET, ORALLY DISINTEGRATING ORAL EVERY 30 MIN PRN
Status: DISCONTINUED | OUTPATIENT
Start: 2018-08-09 | End: 2018-08-09 | Stop reason: HOSPADM

## 2018-08-09 RX ORDER — IOPAMIDOL 612 MG/ML
INJECTION, SOLUTION INTRATHECAL PRN
Status: DISCONTINUED | OUTPATIENT
Start: 2018-08-09 | End: 2018-08-09 | Stop reason: HOSPADM

## 2018-08-09 RX ORDER — LIDOCAINE HYDROCHLORIDE 20 MG/ML
INJECTION, SOLUTION INFILTRATION; PERINEURAL PRN
Status: DISCONTINUED | OUTPATIENT
Start: 2018-08-09 | End: 2018-08-09

## 2018-08-09 RX ORDER — TRIAMCINOLONE ACETONIDE 40 MG/ML
INJECTION, SUSPENSION INTRA-ARTICULAR; INTRAMUSCULAR PRN
Status: DISCONTINUED | OUTPATIENT
Start: 2018-08-09 | End: 2018-08-09 | Stop reason: HOSPADM

## 2018-08-09 RX ORDER — LIDOCAINE 40 MG/G
CREAM TOPICAL
Status: DISCONTINUED | OUTPATIENT
Start: 2018-08-09 | End: 2018-08-09 | Stop reason: HOSPADM

## 2018-08-09 RX ORDER — SODIUM CHLORIDE, SODIUM LACTATE, POTASSIUM CHLORIDE, CALCIUM CHLORIDE 600; 310; 30; 20 MG/100ML; MG/100ML; MG/100ML; MG/100ML
INJECTION, SOLUTION INTRAVENOUS CONTINUOUS
Status: DISCONTINUED | OUTPATIENT
Start: 2018-08-09 | End: 2018-08-09 | Stop reason: HOSPADM

## 2018-08-09 RX ORDER — ONDANSETRON 2 MG/ML
4 INJECTION INTRAMUSCULAR; INTRAVENOUS EVERY 30 MIN PRN
Status: DISCONTINUED | OUTPATIENT
Start: 2018-08-09 | End: 2018-08-09 | Stop reason: HOSPADM

## 2018-08-09 RX ADMIN — TRIAMCINOLONE ACETONIDE 40 MG: 40 INJECTION, SUSPENSION INTRA-ARTICULAR; INTRAMUSCULAR at 12:28

## 2018-08-09 RX ADMIN — LIDOCAINE HYDROCHLORIDE 40 MG: 20 INJECTION, SOLUTION INFILTRATION; PERINEURAL at 12:25

## 2018-08-09 RX ADMIN — IOPAMIDOL 5 ML: 612 INJECTION, SOLUTION INTRATHECAL at 12:28

## 2018-08-09 RX ADMIN — PROPOFOL 50 MG: 10 INJECTION, EMULSION INTRAVENOUS at 12:26

## 2018-08-09 RX ADMIN — PROPOFOL 30 MG: 10 INJECTION, EMULSION INTRAVENOUS at 12:27

## 2018-08-09 RX ADMIN — SODIUM BICARBONATE 5 MEQ: 84 INJECTION, SOLUTION INTRAVENOUS at 12:28

## 2018-08-09 RX ADMIN — PROPOFOL 30 MG: 10 INJECTION, EMULSION INTRAVENOUS at 12:28

## 2018-08-09 RX ADMIN — LIDOCAINE HYDROCHLORIDE 1 ML: 10 INJECTION, SOLUTION EPIDURAL; INFILTRATION; INTRACAUDAL; PERINEURAL at 12:00

## 2018-08-09 NOTE — DISCHARGE INSTRUCTIONS
Home Care Instructions                Procedure:  Epidural Steroid Injection or Joint injection    Activity:    Rest today    Do not work today    Resume normal activity tomorrow    Pain:    You may experience soreness at the injection site for one or two days    You may use an ice pack for 20 minutes every 2 hours for the first 24 hours    You may use a heating pad after the first 24 hours    You may use Tylenol  (acetaminophen) every 4 hours or other pain medicines as directed by your physician    Safety  Sedation medicine, if given may remain active for many hours.    It is important for the next 24 hours that you do not:    Drive a car    Operate machines or power tools    Consume alcohol, including beer    Sign any important papers or legal documents    You may experience numbness radiating into your legs or arms, (depending on the procedure location)  This numbness may last several hours.  Until the numb sensation returns to normal please use caution in walking, climbing stairs, stepping out of your vehicle, etc.    Common side effects of steroids:  Not everyone will experience corticosteroid side effects. If side effects are experienced they will gradually subside in the 7-10 day period following an injection.    Most common side effects include:    Flushed face and/or chest    Feeling of warmth, particularly in face but could be overall feeling of warmth    Increased blood sugar in diabetic patients    Menstrual irregularities may occur.  If taking hormone based birth control an alternate method of birth control is recommended    Sleep disturbances and/or mood swings are possible    Leg cramps    Please contact us if you have:  Severe pain   Fever more than 101.5 degrees Fahrenheit  Signs of infection (redness, swelling or drainage)      If you have questions during normal business hours (8am-5pm Monday-Friday) contact the [facility] at [phone]. If you need help after hours, we recommend that  you go to a hospital emergency room or dial 911.

## 2018-08-09 NOTE — ANESTHESIA POSTPROCEDURE EVALUATION
Patient: Jared J Behm    Procedure(s):  INJECT EPIDURAL LUMBAR 4-5 bilateral - Wound Class: I-Clean    Diagnosis:lumbar disc herniation  Diagnosis Additional Information: No value filed.    Anesthesia Type:  MAC    Note:  Anesthesia Post Evaluation    Patient location during evaluation: Phase 2  Patient participation: Able to fully participate in evaluation  Level of consciousness: awake  Pain management: adequate  Airway patency: patent  Cardiovascular status: acceptable and hemodynamically stable  Respiratory status: acceptable, room air and nonlabored ventilation  Hydration status: stable  PONV: none     Anesthetic complications: None    Comments: Patient was happy with the anesthesia care received and no anesthesia related complications were noted.  I will follow up with the patient again if it is needed.        Last vitals:  There were no vitals filed for this visit.      Electronically Signed By: LUCINDA Carlton CRNA  August 9, 2018  12:45 PM

## 2018-08-09 NOTE — IP AVS SNAPSHOT
[facility] Phase II    [address]     [place] [address]    Phone:  [phone]                                       After Visit Summary   8/9/2018    Jared J Behm    MRN: [id number]           After Visit Summary Signature Page     I have received my discharge instructions, and my questions have been answered. I have discussed any challenges I see with this plan with the nurse or doctor.    ..........................................................................................................................................  Patient/Patient Representative Signature      ..........................................................................................................................................  Patient Representative Print Name and Relationship to Patient    ..................................................               ................................................  Date                                            Time    ..........................................................................................................................................  Reviewed by Signature/Title    ...................................................              ..............................................  Date                                                            Time

## 2018-08-09 NOTE — IP AVS SNAPSHOT
MRN:[id number]                      After Visit Summary   8/9/2018    Jared J Behm    MRN: [id number]           Thank you!     Thank you for choosing [facility] for your care. Our goal is always to provide you with excellent care. Hearing back from our patients is one way we can continue to improve our services. Please take a few minutes to complete the written survey that you may receive in the mail after you visit with us. Thank you!        Patient Information     Date Of Birth          1980        About your hospital stay     You were admitted on:  August 9, 2018 You last received care in the:  [facility] Phase II    You were discharged on:  August 9, 2018       Who to Call     For medical emergencies, please call 911.  For non-urgent questions about your medical care, please call your primary care provider or clinic, [phone]  For questions related to your surgery, please call your surgery clinic        Attending Provider     Provider Specialty    Boy Sloan [facility]       Primary Care Provider Office Phone # Fax #    Radha Birmingham Mai, [phone]      After Care Instructions     Discharge Instructions       Review outpatient procedure discharge instructions as directed by Provider.                  Further instructions from your care team       Home Care Instructions                Procedure:  Epidural Steroid Injection or Joint injection    Activity:    Rest today    Do not work today    Resume normal activity tomorrow    Pain:    You may experience soreness at the injection site for one or two days    You may use an ice pack for 20 minutes every 2 hours for the first 24 hours    You may use a heating pad after the first 24 hours    You may use Tylenol  (acetaminophen) every 4 hours or other pain medicines as directed by your physician    Safety  Sedation medicine, if given may remain active for many hours.    It is important for the next 24 hours that you  "do not:    Drive a car    Operate machines or power tools    Consume alcohol, including beer    Sign any important papers or legal documents    You may experience numbness radiating into your legs or arms, (depending on the procedure location)  This numbness may last several hours.  Until the numb sensation returns to normal please use caution in walking, climbing stairs, stepping out of your vehicle, etc.    Common side effects of steroids:  Not everyone will experience corticosteroid side effects. If side effects are experienced they will gradually subside in the 7-10 day period following an injection.    Most common side effects include:    Flushed face and/or chest    Feeling of warmth, particularly in face but could be overall feeling of warmth    Increased blood sugar in diabetic patients    Menstrual irregularities may occur.  If taking hormone based birth control an alternate method of birth control is recommended    Sleep disturbances and/or mood swings are possible    Leg cramps    Please contact us if you have:  Severe pain   Fever more than 101.5 degrees Fahrenheit  Signs of infection (redness, swelling or drainage)      If you have questions during normal business hours (8am-5pm Monday-Friday) contact the [facility] at [phone]. If you need help after hours, we recommend that you go to a hospital emergency room or dial 911.                 Pending Results     No orders found from 8/7/2018 to 8/10/2018.            Admission Information     Date & Time Provider Department Dept. Phone    8/9/2018 Boy Sloan MD [facility] Phase [phone]      MyChart Information     [employer] lets you send messages to your doctor, view your test results, renew your prescriptions, schedule appointments and more. To sign up, go to [email or web address] . Click on \"Log in\" on the left side of the screen, which will take you to the Welcome page. Then click on \"Sign up Now\" on the right side of " "the page.     You will be asked to enter the access code listed below, as well as some personal information. Please follow the directions to create your username and password.     Your access code is: [email or web address]  Expires: 2018 12:41 PM     Your access code will  in 90 days. If you need help or a new code, please call your [facility] clinic or [phone].        Care EveryWhere ID     This is your Care EveryWhere ID. This could be used by other organizations to access your [facility] medical records  [id number]        Equal Access to Services     [facility]: Hadii nichole narvaez hadleighann Sogeorges, waromanda dave, last kaangela jordan, shabbir martines . So [facility] [phone].    ATENCIÓN: Si habla español, tiene a torres disposición servicios gratuitos de asistencia lingüística. Llame al [phone].    We comply with applicable federal civil rights laws and [place] laws. We do not discriminate on the basis of race, color, national origin, age, disability, sex, sexual orientation, or gender identity.               Review of your medicines      CONTINUE these medicines which have NOT CHANGED        Dose / Directions    ALPRAZolam 0.25 MG tablet   Commonly known as:  XANAX   Used for:  Social anxiety disorder        Dose:  0.25 mg   Take 1 tablet (0.25 mg) by mouth 2 times daily as needed for anxiety (anxiety attacks)   Quantity:  30 tablet   Refills:  0       ARIPiprazole 1 MG/ML Soln solution   Commonly known as:  ABILIFY   Indication:  for \"anger\"        Dose:  5 mg   Take 5 mg by mouth daily   Refills:  0       atenolol-chlorthalidone 50-25 MG per tablet   Commonly known as:  TENORETIC   Used for:  Essential hypertension, benign        TAKE ONE TABLET BY MOUTH ONCE DAILY   Quantity:  30 tablet   Refills:  3       FLUOXETINE HCL PO   Indication:  Depression        Dose:  20 mg   Take 20 mg by mouth daily   Refills:  0                Protect others around you: Learn how to safely " use, store and throw away your medicines at www.disposemymeds.org.             Medication List: This is a list of all your medications and when to take them. Check marks below indicate your daily home schedule. Keep this list as a reference.      Medications           Morning Afternoon Evening Bedtime As Needed    ALPRAZolam 0.25 MG tablet   Commonly known as:  XANAX   Take 1 tablet (0.25 mg) by mouth 2 times daily as needed for anxiety (anxiety attacks)                                ARIPiprazole 1 MG/ML Soln solution   Commonly known as:  ABILIFY   Take 5 mg by mouth daily                                atenolol-chlorthalidone 50-25 MG per tablet   Commonly known as:  TENORETIC   TAKE ONE TABLET BY MOUTH ONCE DAILY                                FLUOXETINE HCL PO   Take 20 mg by mouth daily

## 2018-08-09 NOTE — OP NOTE
CHIEF COMPLAINT: Low back and buttock pain  PROCEDURE:  L4-5 Interlaminar epidural steroid injection using fluoroscopic guidance with contrast dye.   PROCEDURE DETAILS: After written informed consent was obtained from the patient, the patient was escorted to the procedure room.  The patient was placed in the prone position.  A  time out  was conducted to verify patient identity, procedure to be performed, side, site, allergies and any special requirements.  The skin over the neck and upper back region were prepped and draped in normal sterile fashion. Fluoroscopy was used to identify the interspace in an AP view and the skin was anesthetized with 2 mL of 1% lidocaine with bicarbonate buffer.  A 20-gauge 3-1/2 inch Tuohy needle was advanced using the loss of resistance technique with preservative free normal saline with fluoroscopic guidance. After negative aspiration for CSF and blood, 1.5 cc of Omnipaque contrast dye was injected revealing the appropriate epidurogram without evidence of intrathecal or intravascular spread. Following this, a 3-mL solution of 40 mg of triamcinolone with 2 cc preservative-free normal saline was slowly injected.  After injection of the medication, as the needle tip was withdrawn, it was flushed with local anesthetic.  The patient was monitored with blood pressure and pulse oximetry machines with the assistance of an RN throughout the procedure.  The patient was alert and responsive to questions throughout the procedure.   The patient tolerated the procedure well and was observed in the post-procedural area.  The patient was dismissed without apparent complications.     DIAGNOSIS:  1. L4-5 disc extrusion with h/o of leg pains improved with bilateral L4-5 TFESI's  PLAN:  1. Performed a L4-5 interlaminar epidural steroid injection.   2. The patient was instructed to follow-up at the [facility] if today's procedure is not helpful. He could consider a L4-5 intradiscal injection  via discography if today's injection does not provide long-term relief.  Boy Slaon MD  Diplomate of the American Board of Anesthesiology, Pain Medicine

## 2018-08-09 NOTE — ANESTHESIA PREPROCEDURE EVALUATION
Anesthesia Evaluation     . Pt has had prior anesthetic. Type: General and MAC    No history of anesthetic complications          ROS/MED HX    ENT/Pulmonary:     (+)tobacco use, Past use , . .    Neurologic:  - neg neurologic ROS     Cardiovascular:     (+) hypertension----. : . . . :. . No previous cardiac testing       METS/Exercise Tolerance:  >4 METS   Hematologic:  - neg hematologic  ROS       Musculoskeletal:   (+) , , other musculoskeletal- low back pain      GI/Hepatic:  - neg GI/hepatic ROS      (-) GERD   Renal/Genitourinary:  - ROS Renal section negative       Endo:  - neg endo ROS       Psychiatric:     (+) psychiatric history anxiety, bipolar and depression      Infectious Disease:  - neg infectious disease ROS       Malignancy:      - no malignancy   Other:    - neg other ROS                 Physical Exam  Normal systems: cardiovascular, pulmonary and dental    Airway   Mallampati: II  TM distance: >3 FB  Neck ROM: full    Dental     Cardiovascular   Rhythm and rate: regular and normal      Pulmonary    breath sounds clear to auscultation                        Anesthesia Plan      History & Physical Review  History and physical reviewed and following examination; no interval change.    ASA Status:  2 .    NPO Status:  > 8 hours    Plan for MAC with Propofol induction. Maintenance will be TIVA.  Reason for MAC:  Deep or markedly invasive procedure (G8)         Postoperative Care      Consents  Anesthetic plan, risks, benefits and alternatives discussed with:  Patient.  Use of blood products discussed: No .   .                          .

## 2018-08-09 NOTE — ANESTHESIA CARE TRANSFER NOTE
Patient: Jared J Behm    Procedure(s):  INJECT EPIDURAL LUMBAR 4-5 bilateral - Wound Class: I-Clean    Diagnosis: lumbar disc herniation  Diagnosis Additional Information: No value filed.    Anesthesia Type:   MAC     Note:  Airway :Nasal Cannula  Patient transferred to:Phase II  Handoff Report: Identifed the Patient, Identified the Reponsible Provider, Reviewed the pertinent medical history, Discussed the surgical course, Reviewed Intra-OP anesthesia mangement and issues during anesthesia, Set expectations for post-procedure period and Allowed opportunity for questions and acknowledgement of understanding      Vitals: (Last set prior to Anesthesia Care Transfer)    CRNA VITALS  8/9/2018 1204 - 8/9/2018 1244      8/9/2018             Pulse: 53    SpO2: 96 %    Resp Rate (observed): 11                Electronically Signed By: LUCINDA Carlton CRNA  August 9, 2018  12:44 PM

## 2018-08-21 ENCOUNTER — TELEPHONE (OUTPATIENT)
Dept: NEUROSURGERY | Facility: CLINIC | Age: 38
End: 2018-08-21

## 2018-08-21 DIAGNOSIS — M51.9 LUMBAR DISC DISORDER: Primary | ICD-10-CM

## 2018-08-21 DIAGNOSIS — M51.26 LUMBAR DISC HERNIATION: ICD-10-CM

## 2018-08-21 RX ORDER — CYCLOBENZAPRINE HCL 5 MG
5-10 TABLET ORAL 3 TIMES DAILY PRN
Qty: 90 TABLET | Refills: 0 | Status: SHIPPED | OUTPATIENT
Start: 2018-08-21

## 2018-08-21 NOTE — TELEPHONE ENCOUNTER
Reason for Call:  Other appointment    Detailed comments: Patient had an injection in his back on 08/09 and he feels better as far as pain wise, but he's still uncomfortable and is wondering if there is something he can take to relieve this feeling?? Please call patient. Uses [facility] in [place]    Phone Number Patient can be reached at: Home number on file [phone] (home)    Best Time: any    Can we leave a detailed message on this number? YES    Call taken on 8/21/2018 at 8:06 AM by Aminata Pollack

## 2018-08-21 NOTE — TELEPHONE ENCOUNTER
Spoke with patient. He recently received injection on 8/9/18, which has provided pain relief in left leg. His main concern right now is his discomfort in low back. He states he is not interested in PT or any type of surgical intervention, but requesting a muscle relaxant if possible.    Discussed with Dr. Briscoe. Kiana to order one time muscle relaxant. Future refills should go through PCP.     Sent rx to pharmacy. Advised patient to call back with questions or concerns.

## 2018-11-14 ENCOUNTER — TELEPHONE (OUTPATIENT)
Dept: NEUROSURGERY | Facility: CLINIC | Age: 38
End: 2018-11-14

## 2018-11-14 DIAGNOSIS — M51.26 LUMBAR DISC HERNIATION: Primary | ICD-10-CM

## 2018-11-14 NOTE — TELEPHONE ENCOUNTER
Reason for Call: Request for an order or referral:    Order or referral being requested: patient calling to see if he can get another injection in his back    Date needed: as soon as possible    Has the patient been seen by the PCP for this problem? YES    Additional comments:  None     Phone number Patient can be reached at:  Home number on file [phone] (home)    Best Time:  any    Can we leave a detailed message on this number?  YES    Call taken on 11/14/2018 at 1:14 PM by Aminata Pollack

## 2018-11-15 ENCOUNTER — TELEPHONE (OUTPATIENT)
Dept: SURGERY | Facility: CLINIC | Age: 38
End: 2018-11-15

## 2018-11-15 NOTE — TELEPHONE ENCOUNTER
Called patient he is requesting another injection, last injection was on 8/9/18 and provided relief for approximately 2 months. He is currently complaining of low back pain without any radicular symptoms. Would like to have the injection with Dr. Sloan in [place]. Will forward to the care team for approval.

## 2018-11-15 NOTE — TELEPHONE ENCOUNTER
Received orders for JR, called patient and he is now scheduled for 11/20/18 at 300pm with Dr. Sloan.  Instructed patient to make appt for preop physical and to have a  the day of procedure.

## 2018-11-19 ENCOUNTER — TRANSFERRED RECORDS (OUTPATIENT)
Dept: HEALTH INFORMATION MANAGEMENT | Facility: CLINIC | Age: 38
End: 2018-11-19

## 2018-11-20 LAB
CREAT SERPL-MCNC: 0.87 MG/DL (ref 0.6–1.3)
GFR SERPL CREATININE-BSD FRML MDRD: 98.7 ML/MIN/1.73M2
GLUCOSE SERPL-MCNC: 94 MG/DL (ref 74–106)
POTASSIUM SERPL-SCNC: 3.8 MMOL/L (ref 3.5–5.1)

## 2018-12-12 ENCOUNTER — ANESTHESIA EVENT (OUTPATIENT)
Dept: SURGERY | Facility: CLINIC | Age: 38
End: 2018-12-12
Payer: COMMERCIAL

## 2018-12-12 ASSESSMENT — LIFESTYLE VARIABLES: TOBACCO_USE: 1

## 2018-12-13 ENCOUNTER — ANESTHESIA (OUTPATIENT)
Dept: SURGERY | Facility: CLINIC | Age: 38
End: 2018-12-13
Payer: COMMERCIAL

## 2018-12-13 ENCOUNTER — HOSPITAL ENCOUNTER (OUTPATIENT)
Facility: CLINIC | Age: 38
Discharge: HOME | End: 2018-12-13
Attending: ANESTHESIOLOGY | Admitting: ANESTHESIOLOGY
Payer: COMMERCIAL

## 2018-12-13 ENCOUNTER — HOSPITAL ENCOUNTER (OUTPATIENT)
Dept: GENERAL RADIOLOGY | Facility: CLINIC | Age: 38
End: 2018-12-13
Attending: ANESTHESIOLOGY | Admitting: ANESTHESIOLOGY
Payer: COMMERCIAL

## 2018-12-13 VITALS
WEIGHT: 205 LBS | OXYGEN SATURATION: 96 % | TEMPERATURE: 98.3 F | SYSTOLIC BLOOD PRESSURE: 144 MMHG | DIASTOLIC BLOOD PRESSURE: 101 MMHG | BODY MASS INDEX: 31.07 KG/M2 | RESPIRATION RATE: 16 BRPM | HEIGHT: 68 IN

## 2018-12-13 DIAGNOSIS — M51.26 LUMBAR DISC HERNIATION: ICD-10-CM

## 2018-12-13 PROCEDURE — 64483 NJX AA&/STRD TFRM EPI L/S 1: CPT | Mod: LT | Performed by: ANESTHESIOLOGY

## 2018-12-13 PROCEDURE — 25000128 H RX IP 250 OP 636: Performed by: NURSE ANESTHETIST, CERTIFIED REGISTERED

## 2018-12-13 PROCEDURE — 25000125 ZZHC RX 250: Performed by: NURSE ANESTHETIST, CERTIFIED REGISTERED

## 2018-12-13 PROCEDURE — 64484 NJX AA&/STRD TFRM EPI L/S EA: CPT | Mod: LT | Performed by: ANESTHESIOLOGY

## 2018-12-13 PROCEDURE — 25000125 ZZHC RX 250: Performed by: ANESTHESIOLOGY

## 2018-12-13 PROCEDURE — 25000128 H RX IP 250 OP 636: Performed by: ANESTHESIOLOGY

## 2018-12-13 PROCEDURE — 62323 NJX INTERLAMINAR LMBR/SAC: CPT | Performed by: ANESTHESIOLOGY

## 2018-12-13 PROCEDURE — 37000008 ZZH ANESTHESIA TECHNICAL FEE, 1ST 30 MIN: Performed by: ANESTHESIOLOGY

## 2018-12-13 PROCEDURE — 40000277 XR SURGERY CARM FLUORO LESS THAN 5 MIN W STILLS: Mod: TC

## 2018-12-13 RX ORDER — LIDOCAINE 40 MG/G
CREAM TOPICAL
Status: DISCONTINUED | OUTPATIENT
Start: 2018-12-13 | End: 2018-12-13 | Stop reason: HOSPADM

## 2018-12-13 RX ORDER — LIDOCAINE HYDROCHLORIDE 20 MG/ML
INJECTION, SOLUTION INFILTRATION; PERINEURAL PRN
Status: DISCONTINUED | OUTPATIENT
Start: 2018-12-13 | End: 2018-12-13

## 2018-12-13 RX ORDER — PROPOFOL 10 MG/ML
INJECTION, EMULSION INTRAVENOUS PRN
Status: DISCONTINUED | OUTPATIENT
Start: 2018-12-13 | End: 2018-12-13

## 2018-12-13 RX ORDER — TRIAMCINOLONE ACETONIDE 40 MG/ML
INJECTION, SUSPENSION INTRA-ARTICULAR; INTRAMUSCULAR PRN
Status: DISCONTINUED | OUTPATIENT
Start: 2018-12-13 | End: 2018-12-13 | Stop reason: HOSPADM

## 2018-12-13 RX ORDER — IOPAMIDOL 612 MG/ML
INJECTION, SOLUTION INTRATHECAL PRN
Status: DISCONTINUED | OUTPATIENT
Start: 2018-12-13 | End: 2018-12-13 | Stop reason: HOSPADM

## 2018-12-13 RX ADMIN — PROPOFOL 30 MG: 10 INJECTION, EMULSION INTRAVENOUS at 11:26

## 2018-12-13 RX ADMIN — PROPOFOL 50 MG: 10 INJECTION, EMULSION INTRAVENOUS at 11:21

## 2018-12-13 RX ADMIN — PROPOFOL 50 MG: 10 INJECTION, EMULSION INTRAVENOUS at 11:22

## 2018-12-13 RX ADMIN — LIDOCAINE HYDROCHLORIDE 40 MG: 20 INJECTION, SOLUTION INFILTRATION; PERINEURAL at 11:21

## 2018-12-13 RX ADMIN — PROPOFOL 50 MG: 10 INJECTION, EMULSION INTRAVENOUS at 11:25

## 2018-12-13 ASSESSMENT — MIFFLIN-ST. JEOR: SCORE: 1829.37

## 2018-12-13 NOTE — OP NOTE
PRIMARY PROBLEM: Low back pain and left leg pains    PROCEDURE: Left L4-5 and L5-S1  Transforaminal Epidural Steroid Injections with fluoroscopic guidance and contrast.     PROCEDURE DETAILS: After written informed consent was obtained from the patient, the patient was escorted to the procedure room.  The patient was placed in the prone position.  A  time out  was conducted to verify patient identity, procedure to be performed, side, site, allergies and any special requirements.  The skin over the thoracolumbar region was prepped and draped in normal sterile fashion. Fluoroscopy was used to identify the neural foramen in AP view and the skin was anesthetized with 2 mL of 1% lidocaine with bicarbonate buffer. A 22-gauge Quincke spinal needle was advanced through this location and advanced under fluoroscopic guidance towards the neural foramen.  The target zone was the 6 o clock position of the pedicle.   Prior to entering the foramen, the depth of the needle was gauged with a lateral view on fluoroscopy. While still in a lateral view, the needle was slowly advanced to avoid injury to the spinal nerve.  Then, in the oblique view (approximately 28 degrees), after negative aspiration, 1.5 mL of Omnipaque contrast dye was injected revealing epidural spread without evidence of intravascular or intrathecal spread.  Then a 2.5 cc solution of 20 mg of Triamcinolone in 2 mL of  Preservative-Free saline was slowly injected into the epidural space at each segment.  After injection of the medication, as the needle tip was withdrawn, it was flushed with local anesthetic.  Injection is completed at the left L4-5 and L5-S1 segments.  The patient was monitored with blood pressure and pulse oximetry machines with the assistance of an RN throughout the procedure.  The patient was alert and responsive to questions throughout the procedure.   The patient tolerated the procedure well and was observed in the post-procedural area.  The  patient was dismissed without apparent complications.     DIAGNOSIS:  1.  Left lumbar radiculopathy secondary to a left L5 nerve root compression secondary to a L4-5 disc extrusion    PLAN:  1. Performed left L4-5 and left L5-S1  transforaminal epidural steroid injections.  2. The patient was instructed to follow-up per Dr. Sloan's instructions.  I talked to Artis about his back problem and he is getting about 2-1/2-3 months from the injections.  If the pain keeps coming back then I told him to discuss surgical options with Dr. Briscoe.  He has a difficult time considering a surgery and due to his son being special needs and he cannot take time off to recover from a major back surgery.  Perhaps a decompressive surgery would be an option so his recovery time is much quicker.    Boy Sloan MD  Diplomate of the American Board of Anesthesiology, Pain Medicine

## 2018-12-13 NOTE — ANESTHESIA CARE TRANSFER NOTE
Patient: Jared J Behm    Procedure(s):  Injection Epidural Lumbar 4-5 and Left Lumbar 5 - Sacral 1    Diagnosis: Lumbar disc herniation  Diagnosis Additional Information: No value filed.    Anesthesia Type:   MAC     Note:  Airway :Room Air  Patient transferred to:Phase II  Handoff Report: Identifed the Patient, Identified the Reponsible Provider, Reviewed the pertinent medical history, Discussed the surgical course, Reviewed Intra-OP anesthesia mangement and issues during anesthesia, Set expectations for post-procedure period and Allowed opportunity for questions and acknowledgement of understanding      Vitals: (Last set prior to Anesthesia Care Transfer)    CRNA VITALS  12/13/2018 1102 - 12/13/2018 1138      12/13/2018             Pulse:  74    SpO2:  97 %    Resp Rate (observed):  12                Electronically Signed By: LUCINDA Simpson CRNA  December 13, 2018  11:38 AM

## 2018-12-13 NOTE — ANESTHESIA POSTPROCEDURE EVALUATION
Patient: Jared J Behm    Procedure(s):  Injection Epidural Lumbar 4-5 and Left Lumbar 5 - Sacral 1    Diagnosis:Lumbar disc herniation  Diagnosis Additional Information: No value filed.    Anesthesia Type:  MAC    Note:  Anesthesia Post Evaluation    Patient location during evaluation: Phase 2  Patient participation: Able to fully participate in evaluation  Level of consciousness: awake and alert  Pain management: adequate  Airway patency: patent  Cardiovascular status: acceptable  Respiratory status: acceptable  Hydration status: acceptable  PONV: none     Anesthetic complications: None          Last vitals:  Vitals:    12/13/18 1000   BP: (!) 138/103   Resp: 16   Temp: 98.3  F (36.8  C)   SpO2: 96%         Electronically Signed By: LUCINDA Simpson CRNA  December 13, 2018  11:41 AM

## 2018-12-13 NOTE — DISCHARGE INSTRUCTIONS
Home Care Instructions                Procedure:  Epidural Steroid Injection or Joint injection    Activity:    Rest today    Do not work today    Resume normal activity tomorrow    Pain:    You may experience soreness at the injection site for one or two days    You may use an ice pack for 20 minutes every 2 hours for the first 24 hours    You may use a heating pad after the first 24 hours    You may use Tylenol  (acetaminophen) every 4 hours or other pain medicines as directed by your physician    Safety  Sedation medicine, if given may remain active for many hours.    It is important for the next 24 hours that you do not:    Drive a car    Operate machines or power tools    Consume alcohol, including beer    Sign any important papers or legal documents    You may experience numbness radiating into your legs or arms, (depending on the procedure location)  This numbness may last several hours.  Until the numb sensation returns to normal please use caution in walking, climbing stairs, stepping out of your vehicle, etc.    Common side effects of steroids:  Not everyone will experience corticosteroid side effects. If side effects are experienced they will gradually subside in the 7-10 day period following an injection.    Most common side effects include:    Flushed face and/or chest    Feeling of warmth, particularly in face but could be overall feeling of warmth    Increased blood sugar in diabetic patients    Menstrual irregularities may occur.  If taking hormone based birth control an alternate method of birth control is recommended    Sleep disturbances and/or mood swings are possible    Leg cramps    Please contact us if you have:  Severe pain   Fever more than 101.5 degrees Fahrenheit  Signs of infection (redness, swelling or drainage)      If you have questions during normal business hours (8am-5pm Monday-Friday) contact the [facility] at [phone]. If you need help after hours, we recommend that  you go to a hospital emergency room or dial 911.

## 2019-03-26 ENCOUNTER — TRANSFERRED RECORDS (OUTPATIENT)
Dept: HEALTH INFORMATION MANAGEMENT | Facility: CLINIC | Age: 39
End: 2019-03-26

## 2019-04-30 ENCOUNTER — TELEPHONE (OUTPATIENT)
Dept: FAMILY MEDICINE | Facility: CLINIC | Age: 39
End: 2019-04-30

## 2019-04-30 NOTE — TELEPHONE ENCOUNTER
Panel Management Review      Patient has the following on his problem list:     Hypertension   Last three blood pressure readings:  BP Readings from Last 3 Encounters:   12/13/18 (!) 144/101   08/09/18 (!) 116/92   12/14/17 114/81     Blood pressure: FAILED    HTN Guidelines:  Less than 140/90      Composite cancer screening  Chart review shows that this patient is due/due soon for the following None  Summary:    Patient is due/failing the following:   BP CHECK, LDL and PHYSICAL    Action needed:   Patient needs office visit for Bp check.    Type of outreach:    Phone, left message for patient to call back.     Questions for provider review:    None                                                                                                                                    Glory Mg MA 4/30/2019       Chart routed to Care Team .

## 2019-05-02 ENCOUNTER — MEDICAL CORRESPONDENCE (OUTPATIENT)
Dept: HEALTH INFORMATION MANAGEMENT | Facility: CLINIC | Age: 39
End: 2019-05-02

## 2019-05-17 ENCOUNTER — OFFICE VISIT (OUTPATIENT)
Dept: NEUROSURGERY | Facility: CLINIC | Age: 39
End: 2019-05-17
Payer: COMMERCIAL

## 2019-05-17 VITALS
WEIGHT: 220 LBS | HEART RATE: 81 BPM | TEMPERATURE: 98.4 F | OXYGEN SATURATION: 97 % | BODY MASS INDEX: 33.34 KG/M2 | DIASTOLIC BLOOD PRESSURE: 106 MMHG | HEIGHT: 68 IN | SYSTOLIC BLOOD PRESSURE: 156 MMHG

## 2019-05-17 DIAGNOSIS — M54.16 LUMBAR RADICULOPATHY: Primary | ICD-10-CM

## 2019-05-17 PROCEDURE — 99213 OFFICE O/P EST LOW 20 MIN: CPT | Performed by: NEUROLOGICAL SURGERY

## 2019-05-17 ASSESSMENT — MIFFLIN-ST. JEOR: SCORE: 1892.41

## 2019-05-17 ASSESSMENT — PAIN SCALES - GENERAL: PAINLEVEL: EXTREME PAIN (8)

## 2019-05-17 NOTE — PROGRESS NOTES
37-year-old male with L4 5 disc herniation and extruded fragment, severe left leg pain.  Initially had a back injury seven years ago.  Now, over the last four weeks, having eight out of 10, sharp pain from the left hip, the posterior thigh and calf.  No overt weakness.  Worse when he walks.  When he had his past injury, he had successful management with physical therapy and an epidural steroid injection.  Had MRI of the lumbar spine which demonstrated L4 5 disc herniation.    Returns for follow up.  Continued, 8/10 sharp back and left posterior thigh pain, worse with activity.  Limits his ability to walk, work, and take care of his son.  Associated numbness.  PT and several JR without durable improvement.  New MR with some interval resolution of disc herniation, but continued small disc herniation with compression of left L5 nerve root.    Past Medical History:   Diagnosis Date     Anxiety      Anxiety      Hypertension      Past Surgical History:   Procedure Laterality Date     HC REPAIR EXTEN TENDON FINGER W/O GRAFT, EACH  5/2/2006    Repair of extensor tendon, left hand, long finger MP joint.     INJECT EPIDURAL LUMBAR Bilateral 12/14/2017    Procedure: INJECT EPIDURAL LUMBAR;  lumbar 4-5 transforaminal epidural steroid injection, bilateral;  Surgeon: Boy Sloan MD;  Location: [facility]     INJECT EPIDURAL LUMBAR Bilateral 8/9/2018    Procedure: INJECT EPIDURAL LUMBAR;  INJECT EPIDURAL LUMBAR 4-5 bilateral;  Surgeon: Boy Sloan MD;  Location: [facility]     INJECT EPIDURAL LUMBAR N/A 12/13/2018    Procedure: Injection Epidural Lumbar 4-5 and Left Lumbar 5 - Sacral 1;  Surgeon: Boy Sloan MD;  Location: [facility]     Social History     Socioeconomic History     Marital status: Single     Spouse name: Not on file     Number of children: Not on file     Years of education: Not on file     Highest education level: Not on file   Occupational History     Not on file   Social Needs     Financial resource strain: Not  "on file     Food insecurity:     Worry: Not on file     Inability: Not on file     Transportation needs:     Medical: Not on file     Non-medical: Not on file   Tobacco Use     Smoking status: Current Every Day Smoker     Packs/day: 1.00     Types: Cigarettes     Last attempt to quit: 2016     Years since quittin.9     Smokeless tobacco: Current User     Types: Chew   Substance and Sexual Activity     Alcohol use: Yes     Alcohol/week: 0.0 oz     Comment: rare     Drug use: Yes     Comment: occasionally marijuana     Sexual activity: Not Currently   Lifestyle     Physical activity:     Days per week: Not on file     Minutes per session: Not on file     Stress: Not on file   Relationships     Social connections:     Talks on phone: Not on file     Gets together: Not on file     Attends Religion service: Not on file     Active member of club or organization: Not on file     Attends meetings of clubs or organizations: Not on file     Relationship status: Not on file     Intimate partner violence:     Fear of current or ex partner: Not on file     Emotionally abused: Not on file     Physically abused: Not on file     Forced sexual activity: Not on file   Other Topics Concern     Parent/sibling w/ CABG, MI or angioplasty before 65F 55M? Not Asked   Social History Narrative     Not on file     Family History   Problem Relation Age of Onset     Other Cancer Mother         skin     Anxiety Disorder Mother      Other Cancer Maternal Grandfather         pancreatic        ROS: 10 point ROS neg other than the symptoms noted above in the HPI.    Physical Exam  BP (!) 156/106   Pulse 81   Temp 98.4  F (36.9  C)   Ht 1.727 m (5' 8\")   Wt 99.8 kg (220 lb)   SpO2 97%   BMI 33.45 kg/m    HEENT:  Normocephalic, atraumatic.  PERRLA.  EOM s intact.  Visual fields full to gross exam  Neck:  Supple, non-tender, without lymphadenopathy.  Heart:  No peripheral edema  Lungs:  No SOB  Abdomen:  Non-distended.   Skin:  Warm and " dry.  Extremities:  No edema, cyanosis or clubbing.    NEUROLOGICAL EXAMINATION:     Mental status:  Alert and Oriented x 3, speech is fluent.  Cranial nerves:  II-XII intact.   Motor:    Shoulder Abduction:  Right:  5/5   Left:  5/5  Biceps:                      Right:  5/5   Left:  5/5  Triceps:                     Right:  5/5   Left:  5/5  Wrist Extensors:       Right:  5/5   Left:  5/5  Wrist Flexors:           Right:  5/5   Left:  5/5  interosseus :            Right:  5/5   Left:  5/5   Hip Flexor:                Right: 5/5  Left:  5/5  Hip Adductor:             Right:  5/5  Left:  5/5  Hip Abductor:             Right:  5/5  Left:  5/5  Gastroc Soleus:        Right:  5/5  Left:  5/5  Tib/Ant:                      Right:  5/5  Left:  5/5  EHL:                     Right:  5/5  Left:  5/5  Sensation:  Left posterior thigh numbness  Reflexes:  Negative Babinski.  Negative Clonus.  Negative Villanueva's.  Coordination:  Smooth finger to nose testing.   Negative pronator drift.  Smooth tandem walking.    A/P:  37-year-old male with L4 5 disc herniation and extruded fragment, severe left leg pain    I had a discussion with the patient, reviewing the history, symptoms, and imaging  Extensive non-surgical management  Discussed the option of MIS decompression  Risks and benefits discussed

## 2019-05-17 NOTE — PROGRESS NOTES
Patient Education    Education included but not limited to:  - Surgical risks: blood clots, urinating difficulties, nerve damage, infection.  - Pre-operative physical with primary care physician within 30 days of surgical date.   - Pre-operative clearance from other pertaining specialties.   - Discontinue NSAIDS x 7 days prior to surgical date.   -Do not begin taking NSAIDs (Advil, Motrin, Ibuprofen, Nuprin, Diclofenac, Meloxicam, Aleve, Celebrex, Aspirin, etc.) until 6 weeks after surgery if you had a fusion. May cause bleeding and interfere with bone healing.    -May try Tylenol for pain.  -Smoking cessation discussed  -Discussed being off work after surgery, short term disability, FMLA, etc.   -Forms to be completed    -Pre-op timeline: NPO, shower, medications    -Hospital stay: Checking in, surgery, recovery room, hospital room.    - Post operative pain management: narcotics, muscle relaxants, ice, etc.   -No driving while taking narcotics     -Post operative incision care:   Keep your incision clean and dry.   Okay to shower. No submerging in water until incision healed.   Watch for signs of infection and notify clinic if drainage or fever develops.   - Post operative activity limitations recommended until follow up appointment: no lifting > 10 pounds; limited bending, twisting, overhead reaching.  -If a brace is required per Dr. Briscoe, [facility] will fit you for the brace in the hospital.  - Follow up appointments: 6 week post op, 3 months post op. Please call to schedule follow up appointment at [phone].   - Education book was also given to the patient for further review.      Patient verbalized understanding of above instructions. All questions were answered to the best of my ability and the patient's satisfaction. Patient advised to call with any additional questions or concerns.

## 2019-05-17 NOTE — NURSING NOTE
"Jared J Behm is a 39 year old male who presents for:  Chief Complaint   Patient presents with     Neurologic Problem        Initial Vitals:  BP (!) 156/106   Pulse 81   Temp 98.4  F (36.9  C)   Ht 5' 8\" (1.727 m)   Wt 220 lb (99.8 kg)   SpO2 97%   BMI 33.45 kg/m   Estimated body mass index is 33.45 kg/m  as calculated from the following:    Height as of this encounter: 5' 8\" (1.727 m).    Weight as of this encounter: 220 lb (99.8 kg).. Body surface area is 2.19 meters squared. BP completed   Extreme Pain (8)        Nursing Comments:         Ayanna Sanchez  [facility]    "

## 2019-05-17 NOTE — LETTER
5/17/2019         RE: Jared J Behm  [address]  [facility] [address]        Dear Colleague,    Thank you for referring your patient, Jared J Behm, to the [facility]. Please see a copy of my visit note below.    37-year-old male with L4 5 disc herniation and extruded fragment, severe left leg pain.  Initially had a back injury seven years ago.  Now, over the last four weeks, having eight out of 10, sharp pain from the left hip, the posterior thigh and calf.  No overt weakness.  Worse when he walks.  When he had his past injury, he had successful management with physical therapy and an epidural steroid injection.  Had MRI of the lumbar spine which demonstrated L4 5 disc herniation.    Returns for follow up.  Continued, 8/10 sharp back and left posterior thigh pain, worse with activity.  Limits his ability to walk, work, and take care of his son.  Associated numbness.  PT and several JR without durable improvement.  New MR with some interval resolution of disc herniation, but continued small disc herniation with compression of left L5 nerve root.    Past Medical History:   Diagnosis Date     Anxiety      Anxiety      Hypertension      Past Surgical History:   Procedure Laterality Date     HC REPAIR EXTEN TENDON FINGER W/O GRAFT, EACH  5/2/2006    Repair of extensor tendon, left hand, long finger MP joint.     INJECT EPIDURAL LUMBAR Bilateral 12/14/2017    Procedure: INJECT EPIDURAL LUMBAR;  lumbar 4-5 transforaminal epidural steroid injection, bilateral;  Surgeon: Boy Sloan MD;  Location[facility]     INJECT EPIDURAL LUMBAR Bilateral 8/9/2018    Procedure: INJECT EPIDURAL LUMBAR;  INJECT EPIDURAL LUMBAR 4-5 bilateral;  Surgeon: Boy Sloan MD;  Location: [facility]     INJECT EPIDURAL LUMBAR N/A 12/13/2018    Procedure: Injection Epidural Lumbar 4-5 and Left Lumbar 5 - Sacral 1;  Surgeon: Boy Sloan MD;  Location: [facility]     Social History     Socioeconomic History     Marital status:  "Single     Spouse name: Not on file     Number of children: Not on file     Years of education: Not on file     Highest education level: Not on file   Occupational History     Not on file   Social Needs     Financial resource strain: Not on file     Food insecurity:     Worry: Not on file     Inability: Not on file     Transportation needs:     Medical: Not on file     Non-medical: Not on file   Tobacco Use     Smoking status: Current Every Day Smoker     Packs/day: 1.00     Types: Cigarettes     Last attempt to quit: 2016     Years since quittin.9     Smokeless tobacco: Current User     Types: Chew   Substance and Sexual Activity     Alcohol use: Yes     Alcohol/week: 0.0 oz     Comment: rare     Drug use: Yes     Comment: occasionally marijuana     Sexual activity: Not Currently   Lifestyle     Physical activity:     Days per week: Not on file     Minutes per session: Not on file     Stress: Not on file   Relationships     Social connections:     Talks on phone: Not on file     Gets together: Not on file     Attends Druze service: Not on file     Active member of club or organization: Not on file     Attends meetings of clubs or organizations: Not on file     Relationship status: Not on file     Intimate partner violence:     Fear of current or ex partner: Not on file     Emotionally abused: Not on file     Physically abused: Not on file     Forced sexual activity: Not on file   Other Topics Concern     Parent/sibling w/ CABG, MI or angioplasty before 65F 55M? Not Asked   Social History Narrative     Not on file     Family History   Problem Relation Age of Onset     Other Cancer Mother         skin     Anxiety Disorder Mother      Other Cancer Maternal Grandfather         pancreatic        ROS: 10 point ROS neg other than the symptoms noted above in the HPI.    Physical Exam  BP (!) 156/106   Pulse 81   Temp 98.4  F (36.9  C)   Ht 1.727 m (5' 8\")   Wt 99.8 kg (220 lb)   SpO2 97%   BMI 33.45 kg/m  "    HEENT:  Normocephalic, atraumatic.  PERRLA.  EOM s intact.  Visual fields full to gross exam  Neck:  Supple, non-tender, without lymphadenopathy.  Heart:  No peripheral edema  Lungs:  No SOB  Abdomen:  Non-distended.   Skin:  Warm and dry.  Extremities:  No edema, cyanosis or clubbing.    NEUROLOGICAL EXAMINATION:     Mental status:  Alert and Oriented x 3, speech is fluent.  Cranial nerves:  II-XII intact.   Motor:    Shoulder Abduction:  Right:  5/5   Left:  5/5  Biceps:                      Right:  5/5   Left:  5/5  Triceps:                     Right:  5/5   Left:  5/5  Wrist Extensors:       Right:  5/5   Left:  5/5  Wrist Flexors:           Right:  5/5   Left:  5/5  interosseus :            Right:  5/5   Left:  5/5   Hip Flexor:                Right: 5/5  Left:  5/5  Hip Adductor:             Right:  5/5  Left:  5/5  Hip Abductor:             Right:  5/5  Left:  5/5  Gastroc Soleus:        Right:  5/5  Left:  5/5  Tib/Ant:                      Right:  5/5  Left:  5/5  EHL:                     Right:  5/5  Left:  5/5  Sensation:  Left posterior thigh numbness  Reflexes:  Negative Babinski.  Negative Clonus.  Negative Villanueva's.  Coordination:  Smooth finger to nose testing.   Negative pronator drift.  Smooth tandem walking.    A/P:  37-year-old male with L4 5 disc herniation and extruded fragment, severe left leg pain    I had a discussion with the patient, reviewing the history, symptoms, and imaging  Extensive non-surgical management  Discussed the option of MIS decompression  Risks and benefits discussed       Patient Education    Education included but not limited to:  - Surgical risks: blood clots, urinating difficulties, nerve damage, infection.  - Pre-operative physical with primary care physician within 30 days of surgical date.   - Pre-operative clearance from other pertaining specialties.   - Discontinue NSAIDS x 7 days prior to surgical date.   -Do not begin taking NSAIDs (Advil, Motrin, Ibuprofen,  Nuprin, Diclofenac, Meloxicam, Aleve, Celebrex, Aspirin, etc.) until 6 weeks after surgery if you had a fusion. May cause bleeding and interfere with bone healing.    -May try Tylenol for pain.  -Smoking cessation discussed  -Discussed being off work after surgery, short term disability, FMLA, etc.   -Forms to be completed    -Pre-op timeline: NPO, shower, medications    -Hospital stay: Checking in, surgery, recovery room, hospital room.    - Post operative pain management: narcotics, muscle relaxants, ice, etc.   -No driving while taking narcotics     -Post operative incision care:   Keep your incision clean and dry.   Okay to shower. No submerging in water until incision healed.   Watch for signs of infection and notify clinic if drainage or fever develops.   - Post operative activity limitations recommended until follow up appointment: no lifting > 10 pounds; limited bending, twisting, overhead reaching.  -If a brace is required per Dr. Briscoe, [facility] will fit you for the brace in the hospital.  - Follow up appointments: 6 week post op, 3 months post op. Please call to schedule follow up appointment at [phone].   - Education book was also given to the patient for further review.      Patient verbalized understanding of above instructions. All questions were answered to the best of my ability and the patient's satisfaction. Patient advised to call with any additional questions or concerns.                Again, thank you for allowing me to participate in the care of your patient.        Sincerely,        Sam Briscoe MD

## 2019-05-17 NOTE — PATIENT INSTRUCTIONS
Surgery scheduled at [facility] for L4-5 Microdiscectomy/Foraminotomy    Pre-Operative:  -Surgical risks: blood clots in the leg or lung, problems urinating, nerve damage, drainage from the incision, infection, stiffness  - Pre-operative physical with primary care physician within 30 days of surgical date.   -Stop all foods and liquids 8 hours prior to surgery.  -Shower procedure: please shower with antibacterial soap the night before surgery and morning of surgery. Refer to information sheet in folder.   - Discontinue Aspirin, NSAIDs (Advil, Ibuprofen, Naproxen, Nuprin, Diclofenac, Meloxicam, Aleve, Celebrex) x 7 days prior to surgical date. After surgery, do not begin taking these medications until given clearance.  - May try Tylenol for pain.    Post-Operative:  -Hospital stay: likely same day procedure with discharge home day of surgery, may stay for 23 hour observation hospitalization for monitoring.  - Post operative pain  will require pain medications and muscle relaxants. You will receive medication upon discharge.  -Do NOT drive while taking narcotic pain medication.  -Post operative incision care-    -Watch for signs of infection: redness, swelling, warmth, drainage, and fever of 101 degrees or higher. Notify clinic [phone].   -Keep incision clean and dry. You may shower. No submerging incision in water such as pools, hot tubs, baths for at least 8 weeks or until incision is healed.   - Post operative activity limitations for 4-6 weeks after surgery: no lifting > 10 pounds, limited bending, twisting, or overhead reaching. You will be re-evaluated at your follow up appointments.   -If you are currently employed, you will need to be off work for recovery and healing. Please fax any FMLA/short term disability paperwork to [phone]. You may call our clinic when you'd like to return to work and we can provide a work letter.   - Follow up appointments: 6 week post op and 3 months post op. Please  call to schedule follow up appointment at [phone].

## (undated) DEVICE — GLOVE PROTEXIS W/NEU-THERA 7.5  2D73TE75

## (undated) DEVICE — PREP CHLORAPREP 26ML TINTED ORANGE  260815

## (undated) DEVICE — NDL SPINAL 22GA 5" QUINCKE 405148

## (undated) DEVICE — NDL ECLIPSE 18GA 1.5"

## (undated) DEVICE — SYR 05ML LL W/O NDL

## (undated) DEVICE — SYR 10ML LL W/O NDL

## (undated) DEVICE — SYR 10ML FINGER CONTROL W/O NDL 309695

## (undated) DEVICE — TRAY PROCEDURE SUPPORT PAIN MANAGEMENT 332114

## (undated) DEVICE — TUBING IV EXTENSION SET 34"

## (undated) RX ORDER — PROPOFOL 10 MG/ML
INJECTION, EMULSION INTRAVENOUS
Status: DISPENSED
Start: 2018-12-13